# Patient Record
Sex: FEMALE | Race: WHITE | Employment: FULL TIME | URBAN - NONMETROPOLITAN AREA
[De-identification: names, ages, dates, MRNs, and addresses within clinical notes are randomized per-mention and may not be internally consistent; named-entity substitution may affect disease eponyms.]

---

## 2019-11-11 ENCOUNTER — OFFICE VISIT (OUTPATIENT)
Dept: FAMILY MEDICINE CLINIC | Age: 39
End: 2019-11-11
Payer: COMMERCIAL

## 2019-11-11 ENCOUNTER — HOSPITAL ENCOUNTER (OUTPATIENT)
Age: 39
Discharge: HOME | End: 2019-11-13
Payer: COMMERCIAL

## 2019-11-11 VITALS
HEART RATE: 70 BPM | BODY MASS INDEX: 29.32 KG/M2 | TEMPERATURE: 97.7 F | HEIGHT: 65 IN | WEIGHT: 176 LBS | DIASTOLIC BLOOD PRESSURE: 80 MMHG | OXYGEN SATURATION: 99 % | SYSTOLIC BLOOD PRESSURE: 120 MMHG

## 2019-11-11 DIAGNOSIS — E03.9 HYPOTHYROIDISM, UNSPECIFIED TYPE: ICD-10-CM

## 2019-11-11 DIAGNOSIS — E11.65 TYPE 2 DIABETES MELLITUS WITH HYPERGLYCEMIA, WITHOUT LONG-TERM CURRENT USE OF INSULIN (HCC): Primary | ICD-10-CM

## 2019-11-11 DIAGNOSIS — E11.65 TYPE 2 DIABETES MELLITUS WITH HYPERGLYCEMIA, WITHOUT LONG-TERM CURRENT USE OF INSULIN (HCC): ICD-10-CM

## 2019-11-11 LAB
ALBUMIN SERPL-MCNC: 4.6 G/DL (ref 3.5–5.2)
ALP BLD-CCNC: 160 U/L (ref 35–104)
ALT SERPL-CCNC: 31 U/L (ref 0–32)
ANION GAP SERPL CALCULATED.3IONS-SCNC: 22 MMOL/L (ref 7–16)
AST SERPL-CCNC: 15 U/L (ref 0–31)
BASOPHILS ABSOLUTE: 0.04 E9/L (ref 0–0.2)
BASOPHILS RELATIVE PERCENT: 0.5 % (ref 0–2)
BILIRUB SERPL-MCNC: 0.3 MG/DL (ref 0–1.2)
BUN BLDV-MCNC: 11 MG/DL (ref 6–20)
CALCIUM SERPL-MCNC: 9.5 MG/DL (ref 8.6–10.2)
CHLORIDE BLD-SCNC: 97 MMOL/L (ref 98–107)
CHOLESTEROL, TOTAL: 109 MG/DL (ref 0–199)
CO2: 19 MMOL/L (ref 22–29)
CREAT SERPL-MCNC: 0.6 MG/DL (ref 0.5–1)
CREATININE URINE: 84 MG/DL (ref 29–226)
EOSINOPHILS ABSOLUTE: 0.04 E9/L (ref 0.05–0.5)
EOSINOPHILS RELATIVE PERCENT: 0.5 % (ref 0–6)
GFR AFRICAN AMERICAN: >60
GFR NON-AFRICAN AMERICAN: >60 ML/MIN/1.73
GLUCOSE BLD-MCNC: 339 MG/DL (ref 74–99)
HBA1C MFR BLD: 13.5 % (ref 4–5.6)
HCT VFR BLD CALC: 41.8 % (ref 34–48)
HDLC SERPL-MCNC: 35 MG/DL
HEMOGLOBIN: 13.1 G/DL (ref 11.5–15.5)
IMMATURE GRANULOCYTES #: 0.03 E9/L
IMMATURE GRANULOCYTES %: 0.3 % (ref 0–5)
LDL CHOLESTEROL CALCULATED: 62 MG/DL (ref 0–99)
LYMPHOCYTES ABSOLUTE: 2.48 E9/L (ref 1.5–4)
LYMPHOCYTES RELATIVE PERCENT: 28.8 % (ref 20–42)
MCH RBC QN AUTO: 26.4 PG (ref 26–35)
MCHC RBC AUTO-ENTMCNC: 31.3 % (ref 32–34.5)
MCV RBC AUTO: 84.1 FL (ref 80–99.9)
MICROALBUMIN UR-MCNC: <12 MG/L
MICROALBUMIN/CREAT UR-RTO: ABNORMAL (ref 0–30)
MONOCYTES ABSOLUTE: 0.56 E9/L (ref 0.1–0.95)
MONOCYTES RELATIVE PERCENT: 6.5 % (ref 2–12)
NEUTROPHILS ABSOLUTE: 5.47 E9/L (ref 1.8–7.3)
NEUTROPHILS RELATIVE PERCENT: 63.4 % (ref 43–80)
PDW BLD-RTO: 12.7 FL (ref 11.5–15)
PLATELET # BLD: 381 E9/L (ref 130–450)
PMV BLD AUTO: 10.4 FL (ref 7–12)
POTASSIUM SERPL-SCNC: 4 MMOL/L (ref 3.5–5)
RBC # BLD: 4.97 E12/L (ref 3.5–5.5)
SODIUM BLD-SCNC: 138 MMOL/L (ref 132–146)
T4 FREE: 1.26 NG/DL (ref 0.93–1.7)
TOTAL CK: 56 U/L (ref 20–180)
TOTAL PROTEIN: 8.2 G/DL (ref 6.4–8.3)
TRIGL SERPL-MCNC: 61 MG/DL (ref 0–149)
TSH SERPL DL<=0.05 MIU/L-ACNC: 2.94 UIU/ML (ref 0.27–4.2)
URIC ACID, SERUM: 2.4 MG/DL (ref 2.4–5.7)
VLDLC SERPL CALC-MCNC: 12 MG/DL
WBC # BLD: 8.6 E9/L (ref 4.5–11.5)

## 2019-11-11 PROCEDURE — 99214 OFFICE O/P EST MOD 30 MIN: CPT | Performed by: FAMILY MEDICINE

## 2019-11-11 PROCEDURE — 80053 COMPREHEN METABOLIC PANEL: CPT

## 2019-11-11 PROCEDURE — 80061 LIPID PANEL: CPT

## 2019-11-11 PROCEDURE — 85025 COMPLETE CBC W/AUTO DIFF WBC: CPT

## 2019-11-11 PROCEDURE — 84443 ASSAY THYROID STIM HORMONE: CPT

## 2019-11-11 PROCEDURE — 83036 HEMOGLOBIN GLYCOSYLATED A1C: CPT

## 2019-11-11 PROCEDURE — 82550 ASSAY OF CK (CPK): CPT

## 2019-11-11 PROCEDURE — 82044 UR ALBUMIN SEMIQUANTITATIVE: CPT

## 2019-11-11 PROCEDURE — 36415 COLL VENOUS BLD VENIPUNCTURE: CPT

## 2019-11-11 PROCEDURE — 82570 ASSAY OF URINE CREATININE: CPT

## 2019-11-11 PROCEDURE — 84550 ASSAY OF BLOOD/URIC ACID: CPT

## 2019-11-11 PROCEDURE — 84439 ASSAY OF FREE THYROXINE: CPT

## 2019-11-11 ASSESSMENT — ENCOUNTER SYMPTOMS
DIARRHEA: 0
ABDOMINAL PAIN: 0
EYE PAIN: 0
SORE THROAT: 0
SINUS PAIN: 0
BACK PAIN: 0
COUGH: 0
SHORTNESS OF BREATH: 0
CONSTIPATION: 0

## 2019-11-11 ASSESSMENT — PATIENT HEALTH QUESTIONNAIRE - PHQ9
SUM OF ALL RESPONSES TO PHQ9 QUESTIONS 1 & 2: 0
2. FEELING DOWN, DEPRESSED OR HOPELESS: 0
SUM OF ALL RESPONSES TO PHQ QUESTIONS 1-9: 0
SUM OF ALL RESPONSES TO PHQ QUESTIONS 1-9: 0
1. LITTLE INTEREST OR PLEASURE IN DOING THINGS: 0

## 2019-11-12 RX ORDER — PIOGLITAZONEHYDROCHLORIDE 15 MG/1
15 TABLET ORAL DAILY
Qty: 30 TABLET | Refills: 3 | Status: SHIPPED | OUTPATIENT
Start: 2019-11-12 | End: 2020-01-16 | Stop reason: SDUPTHER

## 2019-11-26 ENCOUNTER — TELEPHONE (OUTPATIENT)
Dept: FAMILY MEDICINE CLINIC | Age: 39
End: 2019-11-26

## 2020-01-16 ENCOUNTER — TELEPHONE (OUTPATIENT)
Dept: FAMILY MEDICINE CLINIC | Age: 40
End: 2020-01-16

## 2020-01-16 RX ORDER — PIOGLITAZONEHYDROCHLORIDE 15 MG/1
15 TABLET ORAL DAILY
Qty: 30 TABLET | Refills: 3 | Status: SHIPPED
Start: 2020-01-16 | End: 2020-10-15

## 2020-04-06 RX ORDER — LEVOTHYROXINE SODIUM 0.05 MG/1
50 TABLET ORAL DAILY
Qty: 30 TABLET | Refills: 2 | Status: SHIPPED
Start: 2020-04-06 | End: 2020-09-11 | Stop reason: SDUPTHER

## 2020-04-06 NOTE — TELEPHONE ENCOUNTER
She is calling to get a new script for Synthroid sent to [facility] in [facility].

## 2020-09-11 ENCOUNTER — OFFICE VISIT (OUTPATIENT)
Dept: ENDOCRINOLOGY | Age: 40
End: 2020-09-11
Payer: COMMERCIAL

## 2020-09-11 VITALS
WEIGHT: 169.6 LBS | SYSTOLIC BLOOD PRESSURE: 118 MMHG | DIASTOLIC BLOOD PRESSURE: 70 MMHG | OXYGEN SATURATION: 99 % | BODY MASS INDEX: 27.37 KG/M2 | TEMPERATURE: 97.3 F | RESPIRATION RATE: 16 BRPM | HEART RATE: 83 BPM

## 2020-09-11 LAB — HBA1C MFR BLD: 13.8 %

## 2020-09-11 PROCEDURE — 99204 OFFICE O/P NEW MOD 45 MIN: CPT | Performed by: INTERNAL MEDICINE

## 2020-09-11 PROCEDURE — 83036 HEMOGLOBIN GLYCOSYLATED A1C: CPT | Performed by: INTERNAL MEDICINE

## 2020-09-11 RX ORDER — LEVOTHYROXINE SODIUM 0.05 MG/1
50 TABLET ORAL DAILY
Qty: 90 TABLET | Refills: 3 | Status: SHIPPED
Start: 2020-09-11 | End: 2021-02-22 | Stop reason: SDUPTHER

## 2020-09-11 RX ORDER — GLUCOSAMINE HCL/CHONDROITIN SU 500-400 MG
CAPSULE ORAL
Qty: 150 STRIP | Refills: 5 | Status: SHIPPED
Start: 2020-09-11 | End: 2021-08-16 | Stop reason: SDUPTHER

## 2020-09-11 NOTE — PROGRESS NOTES
[address] [place] [address] Department of Endocrinology Diabetes and Metabolism   [address] [facility] [address]   Phone: [phone]  Fax: [phone]    Date of Service: 9/11/2020    Primary Care Physician: Lee Sandhoff, DO  Referring physician: Andrew Agarwal,*  Provider: Macey Gimenez MD     Reason for the visit:  DM type 2     History of Present Illness: The history is provided by the patient. No  was used. Accuracy of the patient data is excellent. Brandon Slaughter is a very pleasant 37 y.o. female seen today for diabetes management     Brandon Slaughter was diagnosed with diabetes at age 27years old and currently on Actos 15 mg daily, Metformin 1000 mg BID   The patient has not been checking blood sugar at all   Most recent A1c results summarized below  Lab Results   Component Value Date    LABA1C 13.8 09/11/2020    LABA1C 13.5 11/11/2019       Patient has had no hypoglycemic episodes   The patient hasn't been mindful of what has been eating and wasn't following diabetic diet as encouraged  I reviewed current medications and the patient has no issues with diabetes medications but is not taking them regularly. Brandon Slaughter is up to date with eye exam and denied any history of diabetic retinopathy   The patient is due for an eye exam. no h/o diabetic retinopathy  Microvascular complications:  No Retinopathy, Nephropathy or Neuropathy   Macrovascular complications: no CAD, PVD, or Stroke  The patient receives has not received flu shot this year. PAST MEDICAL HISTORY   Past Medical History:   Diagnosis Date    Diabetes mellitus ([address]     Gestational    Pancreatitis 1993       PAST SURGICAL HISTORY   Past Surgical History:   Procedure Laterality Date    CHOLECYSTECTOMY      PANCREAS SURGERY N/A 2003    Bypass intestines through pancreas       SOCIAL HISTORY   Tobacco:   reports that she has never smoked.  She has never used smokeless tobacco.  Alcohol:   reports no history of alcohol use. Drugs:   reports no history of drug use. FAMILY HISTORY   No family history on file. ALLERGIES AND DRUG REACTIONS   Allergies   Allergen Reactions    Demerol Hcl [Meperidine] Itching    Meperidine Hcl        CURRENT MEDICATIONS   Current Outpatient Medications   Medication Sig Dispense Refill    levothyroxine (SYNTHROID) 50 MCG tablet Take 1 tablet by mouth Daily 90 tablet 3    blood glucose monitor strips One-Touch Verio strips. Checks 2 times/day and as needed for symptoms of irregular blood glucose 150 strip 5    lidocaine (XYLOCAINE) 2 % jelly Apply 1/2 ml topically as needed. 120 mL 0    pioglitazone (ACTOS) 15 MG tablet Take 1 tablet by mouth daily 30 tablet 3    metFORMIN (GLUCOPHAGE) 500 MG tablet Take 2 tablets by mouth 2 times daily (with meals) 120 tablet 5     No current facility-administered medications for this visit. Review of Systems  Constitutional: No fever, no chills, no diaphoresis, no generalized weakness. HEENT: No blurred vision, No sore throat, no ear pain, no hair loss  Neck: denied any neck swelling, difficulty swallowing,   Cardio-pulmonary: No CP, SOB or palpitation, No orthopnea or PND. No cough or wheezing. GI: No N/V/D, no constipation, No abdominal pain, no melena or hematochezia   : Denied any dysuria, hematuria, flank pain, discharge, or incontinence. Skin: denied any rash, ulcer, Hirsute, or hyperpigmentation. MSK: denied any joint deformity, joint pain/swelling, muscle pain, or back pain.   Neuro: no numbness, no tingling, no weakness, _    OBJECTIVE    /70 (Site: Right Upper Arm, Position: Sitting, Cuff Size: Medium Adult)   Pulse 83   Temp 97.3 °F (36.3 °C) (Temporal)   Resp 16   Wt 169 lb 9.6 oz (76.9 kg)   SpO2 99%   BMI 27.37 kg/m²   BP Readings from Last 4 Encounters:   09/11/20 118/70   05/18/20 130/84   05/04/20 130/83   11/11/19 120/80     Wt Readings from Last 6 Encounters: 09/11/20 169 lb 9.6 oz (76.9 kg)   05/18/20 171 lb (77.6 kg)   05/04/20 175 lb (79.4 kg)   11/11/19 176 lb (79.8 kg)   08/15/18 182 lb (82.6 kg)   12/07/17 190 lb (86.2 kg)       Physical examination:  General: awake alert, oriented x3, no abnormal position or movements. HEENT: normocephalic non-traumatic, no exophthalmos   Neck: supple, no LN enlargement, no thyromegaly, no thyroid tenderness, no JVD. Pulm: Clear equal air entry no added sounds, no wheezing or rhonchi    CVS: S1 + S2, no murmur, no heave. Dorsalis pedis pulse palpable   Abd: soft lax, no tenderness, no organomegaly, audible bowel sounds. Skin: warm, no lesions, no rash.  No callus, no Ulcers, No acanthosis nigricans  Musculoskeletal: No back tenderness, no kyphosis/scoliosis    Neuro: CN intact, Monofilament sensation decreased bilateral , muscle power normal  Psych: normal mood, and affect      Review of Laboratory Data:  I personally reviewed the following lab:  Lab Results   Component Value Date/Time    WBC 8.6 11/11/2019 10:54 AM    RBC 4.97 11/11/2019 10:54 AM    HGB 13.1 11/11/2019 10:54 AM    HCT 41.8 11/11/2019 10:54 AM    MCV 84.1 11/11/2019 10:54 AM    MCH 26.4 11/11/2019 10:54 AM    MCHC 31.3 (L) 11/11/2019 10:54 AM    RDW 12.7 11/11/2019 10:54 AM     11/11/2019 10:54 AM    MPV 10.4 11/11/2019 10:54 AM      Lab Results   Component Value Date/Time     11/11/2019 10:54 AM    K 4.0 11/11/2019 10:54 AM    CO2 19 (L) 11/11/2019 10:54 AM    BUN 11 11/11/2019 10:54 AM    CREATININE 0.6 11/11/2019 10:54 AM    CALCIUM 9.5 11/11/2019 10:54 AM    LABGLOM >60 11/11/2019 10:54 AM    GFRAA >60 11/11/2019 10:54 AM      Lab Results   Component Value Date/Time    TSH 2.940 11/11/2019 10:54 AM    T4FREE 1.26 11/11/2019 10:54 AM     Lab Results   Component Value Date    LABA1C 13.8 09/11/2020    GLUCOSE 339 11/11/2019    MALBCR - 11/11/2019    LABMICR <12.0 11/11/2019    LABCREA 84 11/11/2019     Lab Results   Component Value Date Hemoglobin F2M    Basic Metabolic Panel    Ambulatory referral to Diabetic Education    blood glucose monitor strips   2. Hypothyroidism, unspecified type  TSH without Reflex    T4, Free    levothyroxine (SYNTHROID) 50 MCG tablet   3. Vitamin D deficiency  Vitamin D 25 Hydroxy     Radha Varela MD  Endocrinologist, [facility] Diabetes Care and Endocrinology   [address] [facility] [address]   Phone: [phone]  Fax: [phone]  --------------------------------------------  An electronic signature was used to authenticate this note.  Merlin Boys, MD on 9/11/2020 at 1:30 PM

## 2020-09-11 NOTE — PATIENT INSTRUCTIONS
Recommendations for today's visit  · Continue Metformin 1000 mg twice a day   · Stop Actos   · Start Glipizide ER 5 mg daily   · Start Tresiba 10 units daily at bedtime (plan to stop in few weeks)   · Check Blood sugar 2 times/day before meals and at bedtime and send us sugar log in a week. These are your blood sugar, blood pressure, cholesterol and A1c goals:  · Blood sugar fastin mg/dl to 130 mg/dl  · Blood sugar before meals: <150 mg/dl  · Peak blood sugar lower than 180 mg/dl  · A1c: between 6.5 - 7%    I you have any questions please call Dr. Spike Jolly office     Khang Bello MD  Endocrinologist, [facility])   [address] [facility], [address] [place] [id number]   Phone: [phone]  Fax: [phone]  Email: [email or web address]

## 2020-09-14 ENCOUNTER — TELEPHONE (OUTPATIENT)
Dept: ENDOCRINOLOGY | Age: 40
End: 2020-09-14

## 2020-09-14 RX ORDER — GLIPIZIDE 5 MG/1
5 TABLET, FILM COATED, EXTENDED RELEASE ORAL DAILY
Qty: 90 TABLET | Refills: 3 | Status: SHIPPED
Start: 2020-09-14 | End: 2020-10-15 | Stop reason: SDUPTHER

## 2020-09-17 ENCOUNTER — FOLLOWUP TELEPHONE ENCOUNTER (OUTPATIENT)
Dept: DIABETES SERVICES | Age: 40
End: 2020-09-17

## 2020-09-17 NOTE — PROGRESS NOTES
Patient has been scheduled for DSMES on 9/21. COVID-19 screen completed as follows:     Patient has no fever greater than 99.9 degrees  Patient has no new onset of cough, SOB, difficulty breathing  Patient has no new onset of sore throat, myalgia, headache, loss of taste/smell, diarrhea, severe headache, abdominal pain, rash, red eyes, vomiting, bruising or bleeding  Patient has not been in contact with someone who was confirmed or suspected to have COVID-19 in the last month  Patient has no personal history of COVID-19 within the last 28 days      Patient was encouraged to self-quarantine between now and the date of his/her appointment.       Patient was instructed to do the following on the day of his/her appointment:      Wear a mask  Do not bring any guests with you  Do not come any earlier than 5 minutes prior to the scheduled appointment time

## 2020-09-18 RX ORDER — PEN NEEDLE, DIABETIC 32GX 5/32"
1 NEEDLE, DISPOSABLE MISCELLANEOUS DAILY
Qty: 100 EACH | Refills: 3 | Status: SHIPPED | OUTPATIENT
Start: 2020-09-18

## 2020-09-21 ENCOUNTER — HOSPITAL ENCOUNTER (OUTPATIENT)
Dept: DIABETES SERVICES | Age: 40
Setting detail: THERAPIES SERIES
Discharge: HOME | End: 2020-09-21
Payer: COMMERCIAL

## 2020-09-21 VITALS — TEMPERATURE: 98 F

## 2020-09-21 PROCEDURE — G0108 DIAB MANAGE TRN  PER INDIV: HCPCS

## 2020-09-21 SDOH — ECONOMIC STABILITY: FOOD INSECURITY: ADDITIONAL INFORMATION: NO

## 2020-09-21 ASSESSMENT — PATIENT HEALTH QUESTIONNAIRE - PHQ9
SUM OF ALL RESPONSES TO PHQ QUESTIONS 1-9: 0
2. FEELING DOWN, DEPRESSED OR HOPELESS: 0
SUM OF ALL RESPONSES TO PHQ9 QUESTIONS 1 & 2: 0
SUM OF ALL RESPONSES TO PHQ QUESTIONS 1-9: 0
1. LITTLE INTEREST OR PLEASURE IN DOING THINGS: 0

## 2020-09-21 NOTE — PROGRESS NOTES
Diabetes Self-Management Education Record    Participant Name: Alaina Gottlieb  Referring Provider: Lola Nolan DO  Assessment/Evaluation Ratings:  1=Needs Instruction   4=Demonstrates Understanding/Competency  2=Needs Review   NC=Not Covered    3=Comprehends Key Points  N/A=Not Applicable  Topics/Learning Objectives Pre-session Assess Date:  9/21/20 KMS Instr. Date Follow-up Date Post- session Eval Comments   Diabetes disease process & Treatment process:   -Define diabetes & prediabetes and ABCs of     diabetes   -Identify own type of diabetes  -Identify lifestyle changes/treatment options 2 9/21/20 KMS  4 Type 2, A1C 13.8%  H/O GDM 14 years ago   H/O non-alcoholic pancreatitis (runs in her family)    Developing strategies for Healthy coping/psychosocial issues:    -Describe feelings about living with diabetes  -Identify coping strategies;   -Identify support needed & support network available 2 9/21/20 KMS  4     9/21/20   PHQ-2 Depression Screen Score: 0  PHQ-9 Score: 0     Prevention, detection & treatment of Chronic complications:    -Identify the prevention, detection and treatment for complications including immunizations, preventive eye, foot, dental and renal exams as indicated per the participant's duration of diabetes and health status.  -Define the natural course of diabetes and the relationship of blood glucose levels to long term complications of diabetes.   2 9/21/20 KMS  4    Prevention, detection & treatment of acute complications:    -List symptoms of hyper & hypoglycemia, and prevention & treatment strategies.   -Describe sick day guidelines  DKA /indications for ketone testing &  when to call physician  1 9/21/20 KMS  4    Identify severe weather/situation crisis  & diabetes supplies management 1 9/21/20 KMS  4    Using medications safely:   -State effects of diabetes medicines on blood glucose levels;  -List diabetes medication taken, action & side effects 2 9/21/20 KMS  4 Metformin 1000 mg BID  Glipizide ER 5 mg daily  Tresiba 10 units daily (patient states doctor is hopeful to wean off in 3 to 4 weeks)    Insulin/Injectables  -Name appropriate injection sites; proper storage; supplies needed;  2 9/21/20 KMS  4     Demonstrate proper technique        Monitoring blood glucose, interpreting and using results:   -Identify recommended & personal blood glucose targets & HgbA1C target levels  -State the Importance of logging blood glucose levels for pattern recognition;   -State benefits of reading/using pt generated health data  -Verbalize safe lancet disposal 2 9/21/20 KMS  4 Monitors 2-3 times per day   -Demonstrate proper testing technique        Incorporating physical activity into lifestyle:   -State effect of exercise on blood glucose levels;   -State benefits of regular exercise;   -Define safety considerations;  -Describe contraindications/maintenance of activity. 2 9/21/20 KMS  4 Walks, bikes    Incorporating nutritional management into lifestyle:   -Describe effect of type, amount & timing of food on blood glucose  -Describe methods for preparing and planning healthy meals  Correctly read food labels 2 9/21/20 KMS  4 Verbalizes a good understanding of using the plate method, measuring carbohydrate portions, and reading labels. Will work towards eating three meals daily, spacing them out every 4.5 to 5 hours. Plan personal carbohydrate-controlled meal based on individualized meal plan  Demonstrate CHO counting/portion control         Developing strategies for problem solving to promote health/change behavior. -Identify 7 self-care behaviors; Personal health risk factors; Benefits, challenges & strategies for behavioral change and set an individualized goal selection. 1 9/21/20 KMS  4 Goal:  I will use the plate method and measure my carbohydrate servings at meals.       Identified Barriers to learning/adherence to self management plan:    None  []  other    Instruction Method: Lecture/Discussion and Handouts    Supporting Education Materials/Equipment Provided: Self-management manual   []Pashto materials       [] services     []Other:      Encounter Type Date Attended Start Time End Time Comments No Show Dates   Assessment 9/21/20 KMS 1000 1020       Session 1 & 2 9/21/20 KMS 6010 [id number]                       In person Follow-up         Gestational Diabetes         Individual MNT        Meter Instrx        Insulin Instrx           Additional Comments: All DSMES completed 1:1 d/t COVID-19 national emergency. Doctor notified via EMR.      Date:           DSMES Follow-up plan based on patients DSMES goal    Dr Notified by [] EMR []Fax        []HgbA1C   []Weight   []Hypertension  []Follow-up with Physician  []Medication compliance   []Plate method/meal plan compliance   []Self-Foot Exam Frequency   []Monitoring Frequency   []Exercise Routine   []Goal Attainment       []Patient lost to follow-up  Dr Notified by []EMR []Fax     Personal Support Plan:      [x] Keep all scheduled doctor appointments   [x] Make and keep appointments with specialists (foot, eye, dentist) as recommended   [] Consult my pharmacist about all new medications or to ask any medication questions   [] Get tested for sleep apnea   [] Seek help for:   [x] Make an appointment with:  Ophthalmologist   [] Attend smoking cessation classes or call 1-800-QUIT-NOW  [] Attend Diabetes Support Group   [] Use diabetes magazines, books, or credible web-sites like the ADA for more information  [] Increase exercise at home or join an exercise program:   [] Other:

## 2020-09-21 NOTE — LETTER
[facility])- Diabetes Education    2020       Re:     Chayito Andrade         :  1980  Dear Dr. Ana Rosa Schmitt:                    Thank you for referring your patient, Chayito Andrade, for diabetes education. Your patient has completed her personalized initial comprehensive education plan. Your patient attended class on 20 that addressed the following topics:    Nursing/Medical [x]      Nutrition [x]  [] Diabetes disease process & treatment   [] Diabetes medication use and safety   [] Self-monitoring blood glucose/interpreting results  [] Prevention, detection and treatment of acute complications  [] Prevention, detection and treatment of chronic complications  [] Developing strategies to address psychosocial issues    [] Nutritional management: basic principles   [] Carbohydrate counting, plate method, label reading  [] Benefits of/ways to incorporate physical activity  [] Problem solving and preparing for crisis situations  [] Diabetes supply management  [] Goal setting and behavior modification         PHQ-9 Depression Screen Score:  0  0-4: Minimal Depression                5-9: Mild Depression    10-14: Moderate Depression  15-19: Moderately Severe Depression  20-27: Severe Depression     COMMENTS:      PATIENT SELECTED GOAL:   [x]  I will follow my meal plan and measure my carbohydrate foods. []  I will increase my activity to:  []  I will check my blood glucose as ordered by my doctor. []  I will take my medications at the correct times as ordered by my doctor.   []  Other:      DIABETES SELF-MANAGEMENT SUPPORT PLAN/REFERRALS (patient identified):  [x] Keep my scheduled visits with my doctor   [x] Make and keep appointments with specialists (foot, eye, dentist) as recommended  [] Consult my pharmacist with all new medications and/or any medication questions  [] Get tested for sleep apnea  [] Seek help for:   [x] Make an appointment with: ophthalmologist [] Attend smoking cessation classes or call help-line (1-947-QUIT-NOW; [phone])  [] Attend a diabetes support group  [] Use diabetes magazines, books, or the American Diabetes Association website (www.diabetes. org) for more information    [] Start or increase exercising at home or join a program:  [] Other: There will be a follow-up in 3 months to evaluate the attainment of their chosen goal, and self identified support plan and you will be notified of their progress. Thank you for referring this patient to our program.  Please do not hesitate to call if you have any questions at [phone] [facility] or [facility]) or [phone] ([facility]).         Sincerely,    Diabetes Educators:     []  MEGAN Mota      [x]  MEGAN Larson     []  RADHA Cooper         []  MS RADHA Ferrera   []  Luiza ROWLAND  []  RADHA Duvall        Diabetes

## 2020-10-15 ENCOUNTER — OFFICE VISIT (OUTPATIENT)
Dept: ENDOCRINOLOGY | Age: 40
End: 2020-10-15
Payer: COMMERCIAL

## 2020-10-15 ENCOUNTER — OFFICE VISIT (OUTPATIENT)
Dept: FAMILY MEDICINE CLINIC | Age: 40
End: 2020-10-15
Payer: COMMERCIAL

## 2020-10-15 VITALS
SYSTOLIC BLOOD PRESSURE: 124 MMHG | RESPIRATION RATE: 16 BRPM | HEART RATE: 83 BPM | DIASTOLIC BLOOD PRESSURE: 70 MMHG | WEIGHT: 171.8 LBS | OXYGEN SATURATION: 98 % | TEMPERATURE: 98.2 F | BODY MASS INDEX: 27.73 KG/M2

## 2020-10-15 VITALS
TEMPERATURE: 97.9 F | DIASTOLIC BLOOD PRESSURE: 68 MMHG | BODY MASS INDEX: 28.49 KG/M2 | HEIGHT: 65 IN | WEIGHT: 171 LBS | OXYGEN SATURATION: 99 % | HEART RATE: 70 BPM | SYSTOLIC BLOOD PRESSURE: 114 MMHG

## 2020-10-15 PROCEDURE — 99213 OFFICE O/P EST LOW 20 MIN: CPT | Performed by: NURSE PRACTITIONER

## 2020-10-15 PROCEDURE — 99214 OFFICE O/P EST MOD 30 MIN: CPT | Performed by: INTERNAL MEDICINE

## 2020-10-15 RX ORDER — GLIPIZIDE 5 MG/1
5 TABLET, FILM COATED, EXTENDED RELEASE ORAL DAILY
Qty: 90 TABLET | Refills: 2 | Status: SHIPPED
Start: 2020-10-15 | End: 2021-08-16 | Stop reason: SDUPTHER

## 2020-10-15 ASSESSMENT — ENCOUNTER SYMPTOMS
VOMITING: 0
CHEST TIGHTNESS: 0
COUGH: 0
DIARRHEA: 0
NAUSEA: 0
CONSTIPATION: 0
SHORTNESS OF BREATH: 0
ABDOMINAL PAIN: 0
WHEEZING: 0

## 2020-10-15 NOTE — PROGRESS NOTES
Chief Complaint:   Other (lump on lip; )      History of Present Illness   HPI:  Patient presents today for a mass on her left upper lip. Patient reports that it has been there for the last couple months. She states it is not painful. She tried to \"pop\" it and poke it with a needle to get rid of it. She states there is no drainage or bleeding from the mass. The mass is mobile. She denies any fever or chills. She is requesting a referral to plastics for removal of lump. Prior to Visit Medications    Medication Sig Taking? Authorizing Provider   metFORMIN (GLUCOPHAGE) 500 MG tablet Take 2 tablets by mouth 2 times daily (with meals) Yes Floresita Collins MD   glipiZIDE (GLUCOTROL XL) 5 MG extended release tablet Take 1 tablet by mouth daily Yes Floresita Collins MD   Insulin Pen Needle (BD PEN NEEDLE JULIAN U/F) 32G X 4 MM MISC 1 each by Does not apply route daily Yes Floresita Collins MD   levothyroxine (SYNTHROID) 50 MCG tablet Take 1 tablet by mouth Daily Yes Floresita Collins MD   blood glucose monitor strips One-Touch Verio strips. Checks 2 times/day and as needed for symptoms of irregular blood glucose Yes Layo Luque MD   lidocaine (XYLOCAINE) 2 % jelly Apply 1/2 ml topically as needed. Yes Hilda Kingsley, [facility]       Review of Systems   Review of Systems   Constitutional: Negative for activity change, chills and fever. HENT: Negative for congestion. Respiratory: Negative for cough, chest tightness, shortness of breath and wheezing. Cardiovascular: Negative for chest pain, palpitations and leg swelling. Gastrointestinal: Negative for abdominal pain, constipation, diarrhea, nausea and vomiting. Genitourinary: Negative for dysuria and urgency. Musculoskeletal: Negative for myalgias and neck pain. Neurological: Negative for dizziness, weakness, light-headedness and numbness. Psychiatric/Behavioral: The patient is not nervous/anxious.         Patient's medical, social,

## 2020-10-15 NOTE — PROGRESS NOTES
[address] [facility] Department of Endocrinology Diabetes and Metabolism   [address] [facility] [address]   Phone: [phone]  Fax: [phone]    Date of Service: 10/15/2020    Primary Care Physician: Karlie Stoner DO  Referring physician: No ref. provider found  Provider: Elizabeth Solis MD     Reason for the visit:  DM type 2     History of Present Illness: The history is provided by the patient. No  was used. Accuracy of the patient data is excellent. Chivo Fowler is a very pleasant 37 y.o. female seen today for diabetes management     Chivo Fowler was diagnosed with diabetes at age 27years old and currently on  Metformin 1000 mg BID and glipizide 5 mg daily. Had a sample of tresiba for 2-3 weeks and completed. The patient has been checking BG at least 2 times a day ranging from . Most recent A1c results summarized below  Lab Results   Component Value Date    LABA1C 13.8 09/11/2020    LABA1C 13.5 11/11/2019       Patient has had no hypoglycemic episodes   The patient has been mindful of what has been eating and has been following diabetic diet as encouraged, with occasional \"cheat day\"   I reviewed current medications and the patient has no issues with diabetes medications and taking them regularly. Chivo Fowler is due for an eye exam. no h/o diabetic retinopathy  Microvascular complications:  No Retinopathy, Nephropathy or Neuropathy   Macrovascular complications: no CAD, PVD, or Stroke  The patient receives has not received flu shot this year. PAST MEDICAL HISTORY   Past Medical History:   Diagnosis Date    Diabetes mellitus ([address]     Gestational    Pancreatitis 1993       PAST SURGICAL HISTORY   Past Surgical History:   Procedure Laterality Date    CHOLECYSTECTOMY      PANCREAS SURGERY N/A 2003    Bypass intestines through pancreas       SOCIAL HISTORY   Tobacco:   reports that she has never smoked.  She has never used smokeless tobacco.  Alcohol:   reports no history of alcohol use. Drugs:   reports no history of drug use. FAMILY HISTORY   No family history on file. ALLERGIES AND DRUG REACTIONS   Allergies   Allergen Reactions    Demerol Hcl [Meperidine] Itching    Meperidine Hcl        CURRENT MEDICATIONS   Current Outpatient Medications   Medication Sig Dispense Refill    Insulin Pen Needle (BD PEN NEEDLE JULIAN U/F) 32G X 4 MM MISC 1 each by Does not apply route daily 100 each 3    glipiZIDE (GLUCOTROL XL) 5 MG extended release tablet Take 1 tablet by mouth daily 90 tablet 3    levothyroxine (SYNTHROID) 50 MCG tablet Take 1 tablet by mouth Daily 90 tablet 3    blood glucose monitor strips One-Touch Verio strips. Checks 2 times/day and as needed for symptoms of irregular blood glucose 150 strip 5    lidocaine (XYLOCAINE) 2 % jelly Apply 1/2 ml topically as needed. 120 mL 0    metFORMIN (GLUCOPHAGE) 500 MG tablet Take 2 tablets by mouth 2 times daily (with meals) 120 tablet 5     No current facility-administered medications for this visit. Review of Systems  Constitutional: No fever, no chills, no diaphoresis, no generalized weakness. HEENT: No blurred vision, No sore throat, no ear pain, no hair loss  Neck: denied any neck swelling, difficulty swallowing,   Cardio-pulmonary: No CP, SOB or palpitation, No orthopnea or PND. No cough or wheezing. GI: No N/V/D, no constipation, No abdominal pain, no melena or hematochezia   : Denied any dysuria, hematuria, flank pain, discharge, or incontinence. Skin: denied any rash, ulcer, Hirsute, or hyperpigmentation. MSK: denied any joint deformity, joint pain/swelling, muscle pain, or back pain.   Neuro: no numbness, no tingling, no weakness,     OBJECTIVE    /70 (Site: Right Upper Arm, Position: Sitting, Cuff Size: Medium Adult)   Pulse 83   Temp 98.2 °F (36.8 °C) (Temporal)   Resp 16   Wt 171 lb 12.8 oz (77.9 kg)   SpO2 98%   BMI 27.73 kg/m²   BP Readings from Last 4 Encounters:   10/15/20 124/70   09/11/20 118/70   05/18/20 130/84   05/04/20 130/83     Wt Readings from Last 6 Encounters:   10/15/20 171 lb 12.8 oz (77.9 kg)   09/11/20 169 lb 9.6 oz (76.9 kg)   05/18/20 171 lb (77.6 kg)   05/04/20 175 lb (79.4 kg)   11/11/19 176 lb (79.8 kg)   08/15/18 182 lb (82.6 kg)       Physical examination:  General: awake alert, oriented x3, no abnormal position or movements. HEENT: normocephalic non-traumatic, no exophthalmos   Neck: supple, no LN enlargement, no thyromegaly, no thyroid tenderness, no JVD. Pulm: Clear equal air entry no added sounds, no wheezing or rhonchi    CVS: S1 + S2, no murmur, no heave. Dorsalis pedis pulse palpable   Abd: soft lax, no tenderness, no organomegaly, audible bowel sounds. Skin: warm, no lesions, no rash.  No callus, no Ulcers, No acanthosis nigricans  Musculoskeletal: No back tenderness, no kyphosis/scoliosis    Neuro: CN intact, Monofilament sensation bilateral , muscle power normal  Psych: normal mood, and affect      Review of Laboratory Data:  I personally reviewed the following lab:  Lab Results   Component Value Date/Time    WBC 8.6 11/11/2019 10:54 AM    RBC 4.97 11/11/2019 10:54 AM    HGB 13.1 11/11/2019 10:54 AM    HCT 41.8 11/11/2019 10:54 AM    MCV 84.1 11/11/2019 10:54 AM    MCH 26.4 11/11/2019 10:54 AM    MCHC 31.3 (L) 11/11/2019 10:54 AM    RDW 12.7 11/11/2019 10:54 AM     11/11/2019 10:54 AM    MPV 10.4 11/11/2019 10:54 AM      Lab Results   Component Value Date/Time     11/11/2019 10:54 AM    K 4.0 11/11/2019 10:54 AM    CO2 19 (L) 11/11/2019 10:54 AM    BUN 11 11/11/2019 10:54 AM    CREATININE 0.6 11/11/2019 10:54 AM    CALCIUM 9.5 11/11/2019 10:54 AM    LABGLOM >60 11/11/2019 10:54 AM    GFRAA >60 11/11/2019 10:54 AM      Lab Results   Component Value Date/Time    TSH 2.940 11/11/2019 10:54 AM    T4FREE 1.26 11/11/2019 10:54 AM     Lab Results   Component Value Date    LABA1C 13.8 09/11/2020 GLUCOSE 339 11/11/2019    MALBCR - 11/11/2019    LABMICR <12.0 11/11/2019    LABCREA 84 11/11/2019     Lab Results   Component Value Date    LABA1C 13.8 09/11/2020    LABA1C 13.5 11/11/2019     Lab Results   Component Value Date    TRIG 61 11/11/2019    HDL 35 11/11/2019    LDLCALC 62 11/11/2019    CHOL 109 11/11/2019     No results found for: [address] [place] [address] [address] Records/Labs/Images review:   I personally reviewed and summarized previous records   All labs and imaging studies were independently reviewed     [address] [facility], a 37 y.o.-old female seen in for the following issues     Diabetes Mellitus Type 2    · Improved control   · Will continue Metformin 1000 mg BID, Glipiizde ER 5 mg daily  · The patient was advised to check blood sugars 2 times a day and send BS readings to our office in a week. · Discussed with patient A1c and blood sugar goals   · Patient will need routine diabetes maintenance and prevention  · The patient was referred to diabetic educator for further teaching   · Diabetes labs before next visit     H/o Dietary noncompliance   Improved    Discussed with patient the importance of eating consistent carbohydrate meals, avoiding high glycemic index food. Also, discussed with patient the risk and negative consequences of dietary noncompliance on blood glucose control, blood pressure and weight    Hypothyroidism  · On synthroid 50 mcg daily  · Follow up TSH next visit. Return in about 4 months (around 2/15/2021) for DM type 2 . The above issues were reviewed with the patient who understood and agreed with the plan. Thank you for allowing us to participate in the care of this patient. Please do not hesitate to contact us with any additional questions. Diagnosis Orders   1. Hypothyroidism, unspecified type  TSH without Reflex    T4, Free   2. Vitamin D deficiency  Vitamin D 25 Hydroxy   3.  Type 2 diabetes mellitus without complication, without long-term current use of insulin (HCC)  Basic Metabolic Panel    Microalbumin / Creatinine Urine Ratio    Hemoglobin A1C    metFORMIN (GLUCOPHAGE) 500 MG tablet    glipiZIDE (GLUCOTROL XL) 5 MG extended release tablet     Melissa Abdi MD  Endocrinologist, [facility] Diabetes Care and Endocrinology   [address] [facility], [address] [place] [address],[address] [address]   Phone: [phone]  Fax: [phone]  --------------------------------------------  An electronic signature was used to authenticate this note.  Andrew Gonzalez MD on 10/15/2020 at 11:46 AM

## 2020-12-18 ENCOUNTER — TELEPHONE (OUTPATIENT)
Dept: DIABETES SERVICES | Age: 40
End: 2020-12-18

## 2021-02-15 DIAGNOSIS — E03.9 HYPOTHYROIDISM, UNSPECIFIED TYPE: ICD-10-CM

## 2021-02-15 DIAGNOSIS — E55.9 VITAMIN D DEFICIENCY: ICD-10-CM

## 2021-02-15 DIAGNOSIS — E11.9 TYPE 2 DIABETES MELLITUS WITHOUT COMPLICATION, WITHOUT LONG-TERM CURRENT USE OF INSULIN (HCC): ICD-10-CM

## 2021-02-15 LAB
CREATININE URINE: 159 MG/DL (ref 29–226)
HBA1C MFR BLD: 7.8 % (ref 4–5.6)
MICROALBUMIN UR-MCNC: <12 MG/L
MICROALBUMIN/CREAT UR-RTO: ABNORMAL (ref 0–30)

## 2021-02-16 LAB
ANION GAP SERPL CALCULATED.3IONS-SCNC: 8 MMOL/L (ref 7–16)
BUN BLDV-MCNC: 17 MG/DL (ref 6–20)
CALCIUM SERPL-MCNC: 9.4 MG/DL (ref 8.6–10.2)
CHLORIDE BLD-SCNC: 106 MMOL/L (ref 98–107)
CO2: 26 MMOL/L (ref 22–29)
CREAT SERPL-MCNC: 0.7 MG/DL (ref 0.5–1)
GFR AFRICAN AMERICAN: >60
GFR NON-AFRICAN AMERICAN: >60 ML/MIN/1.73
GLUCOSE BLD-MCNC: 108 MG/DL (ref 74–99)
POTASSIUM SERPL-SCNC: 4.2 MMOL/L (ref 3.5–5)
SODIUM BLD-SCNC: 140 MMOL/L (ref 132–146)
T4 FREE: 1.25 NG/DL (ref 0.93–1.7)
TSH SERPL DL<=0.05 MIU/L-ACNC: 4.3 UIU/ML (ref 0.27–4.2)
VITAMIN D 25-HYDROXY: 17 NG/ML (ref 30–100)

## 2021-02-22 ENCOUNTER — OFFICE VISIT (OUTPATIENT)
Dept: ENDOCRINOLOGY | Age: 41
End: 2021-02-22
Payer: COMMERCIAL

## 2021-02-22 VITALS
OXYGEN SATURATION: 98 % | BODY MASS INDEX: 31.28 KG/M2 | WEIGHT: 188 LBS | TEMPERATURE: 97.7 F | SYSTOLIC BLOOD PRESSURE: 124 MMHG | DIASTOLIC BLOOD PRESSURE: 72 MMHG | HEART RATE: 75 BPM

## 2021-02-22 DIAGNOSIS — E03.9 HYPOTHYROIDISM, UNSPECIFIED TYPE: Primary | ICD-10-CM

## 2021-02-22 DIAGNOSIS — E55.9 VITAMIN D DEFICIENCY: ICD-10-CM

## 2021-02-22 DIAGNOSIS — E11.69 TYPE 2 DIABETES MELLITUS WITH OTHER SPECIFIED COMPLICATION, UNSPECIFIED WHETHER LONG TERM INSULIN USE (HCC): ICD-10-CM

## 2021-02-22 PROCEDURE — 99214 OFFICE O/P EST MOD 30 MIN: CPT | Performed by: INTERNAL MEDICINE

## 2021-02-22 PROCEDURE — 3051F HG A1C>EQUAL 7.0%<8.0%: CPT | Performed by: INTERNAL MEDICINE

## 2021-02-22 RX ORDER — LEVOTHYROXINE SODIUM 0.05 MG/1
TABLET ORAL
Qty: 110 TABLET | Refills: 3 | Status: SHIPPED
Start: 2021-02-22 | End: 2021-08-16 | Stop reason: SDUPTHER

## 2021-02-22 NOTE — PROGRESS NOTES
[address] [place] [address] Department of Endocrinology Diabetes and Metabolism   [address] [facility] [address]   Phone: [phone]  Fax: [phone]    Date of Service: 2/22/2021    Primary Care Physician: Cynthia Pedersen DO  Provider: Desiree Wallis MD     Reason for the visit:  DM type 2     History of Present Illness: The history is provided by the patient. No  was used. Accuracy of the patient data is excellent. Jose Maria Mcrae is a very pleasant 41 y.o. female seen today for diabetes management     Jose Maria Mcrae was diagnosed with diabetes at age 27years old and currently on  Metformin 1000 mg BID and glipizide 5 mg daily. Had a sample of tresiba for 2-3 weeks and completed. The patient has been checking BG at least 2 times a day and most readinsg at gal   Most recent A1c results summarized below  Lab Results   Component Value Date    LABA1C 7.8 02/15/2021    LABA1C 13.8 09/11/2020    LABA1C 13.5 11/11/2019       Patient has had no hypoglycemic episodes   The patient has been mindful of what has been eating and has been following diabetic diet as encouraged, with occasional \"cheat day\"   I reviewed current medications and the patient has no issues with diabetes medications and taking them regularly. Jose Maria Mcrae is due for an eye exam. no h/o diabetic retinopathy  Microvascular complications:  No Retinopathy, Nephropathy or Neuropathy   Macrovascular complications: no CAD, PVD, or Stroke  The patient receives has not received flu shot this year. PAST MEDICAL HISTORY   Past Medical History:   Diagnosis Date    Diabetes mellitus ([address]     Gestational    Pancreatitis 1993       PAST SURGICAL HISTORY   Past Surgical History:   Procedure Laterality Date    CHOLECYSTECTOMY      PANCREAS SURGERY N/A 2003    Bypass intestines through pancreas       SOCIAL HISTORY   Tobacco:   reports that she has never smoked.  She has never used smokeless tobacco.  Alcohol:   reports no history of alcohol use. Drugs:   reports no history of drug use. FAMILY HISTORY   No family history on file. ALLERGIES AND DRUG REACTIONS   Allergies   Allergen Reactions    Demerol Hcl [Meperidine] Itching    Meperidine Hcl        CURRENT MEDICATIONS   Current Outpatient Medications   Medication Sig Dispense Refill    metFORMIN (GLUCOPHAGE) 500 MG tablet Take 2 tablets by mouth 2 times daily (with meals) 360 tablet 2    glipiZIDE (GLUCOTROL XL) 5 MG extended release tablet Take 1 tablet by mouth daily 90 tablet 2    Insulin Pen Needle (BD PEN NEEDLE JULIAN U/F) 32G X 4 MM MISC 1 each by Does not apply route daily 100 each 3    levothyroxine (SYNTHROID) 50 MCG tablet Take 1 tablet by mouth Daily 90 tablet 3    blood glucose monitor strips One-Touch Verio strips. Checks 2 times/day and as needed for symptoms of irregular blood glucose 150 strip 5    lidocaine (XYLOCAINE) 2 % jelly Apply 1/2 ml topically as needed. 120 mL 0     No current facility-administered medications for this visit. Review of Systems  Constitutional: No fever, no chills, no diaphoresis, no generalized weakness. HEENT: No blurred vision, No sore throat, no ear pain, no hair loss  Neck: denied any neck swelling, difficulty swallowing,   Cardio-pulmonary: No CP, SOB or palpitation, No orthopnea or PND. No cough or wheezing. GI: No N/V/D, no constipation, No abdominal pain, no melena or hematochezia   : Denied any dysuria, hematuria, flank pain, discharge, or incontinence. Skin: denied any rash, ulcer, Hirsute, or hyperpigmentation. MSK: denied any joint deformity, joint pain/swelling, muscle pain, or back pain.   Neuro: no numbness, no tingling, no weakness,     OBJECTIVE    /72   Pulse 75   Temp 97.7 °F (36.5 °C)   Wt 188 lb (85.3 kg)   SpO2 98%   BMI 31.28 kg/m²   BP Readings from Last 4 Encounters:   02/22/21 124/72   10/15/20 114/68   10/15/20 124/70   09/11/20 118/70     Wt Readings from Last 6 Encounters:   02/22/21 188 lb (85.3 kg)   10/15/20 171 lb (77.6 kg)   10/15/20 171 lb 12.8 oz (77.9 kg)   09/11/20 169 lb 9.6 oz (76.9 kg)   05/18/20 171 lb (77.6 kg)   05/04/20 175 lb (79.4 kg)       Physical examination:  General: awake alert, oriented x3, no abnormal position or movements. HEENT: normocephalic non-traumatic, no exophthalmos   Neck: supple, no LN enlargement, no thyromegaly, no thyroid tenderness, no JVD. Pulm: Clear equal air entry no added sounds, no wheezing or rhonchi    CVS: S1 + S2, no murmur, no heave. Dorsalis pedis pulse palpable   Abd: soft lax, no tenderness, no organomegaly, audible bowel sounds. Skin: warm, no lesions, no rash.  No callus, no Ulcers, No acanthosis nigricans  Musculoskeletal: No back tenderness, no kyphosis/scoliosis    Neuro: CN intact, Monofilament sensation bilateral , muscle power normal  Psych: normal mood, and affect      Review of Laboratory Data:  I personally reviewed the following lab:  Lab Results   Component Value Date/Time    WBC 8.6 11/11/2019 10:54 AM    RBC 4.97 11/11/2019 10:54 AM    HGB 13.1 11/11/2019 10:54 AM    HCT 41.8 11/11/2019 10:54 AM    MCV 84.1 11/11/2019 10:54 AM    MCH 26.4 11/11/2019 10:54 AM    MCHC 31.3 (L) 11/11/2019 10:54 AM    RDW 12.7 11/11/2019 10:54 AM     11/11/2019 10:54 AM    MPV 10.4 11/11/2019 10:54 AM      Lab Results   Component Value Date/Time     02/15/2021 12:40 PM    K 4.2 02/15/2021 12:40 PM    CO2 26 02/15/2021 12:40 PM    BUN 17 02/15/2021 12:40 PM    CREATININE 0.7 02/15/2021 12:40 PM    CALCIUM 9.4 02/15/2021 12:40 PM    LABGLOM >60 02/15/2021 12:40 PM    GFRAA >60 02/15/2021 12:40 PM      Lab Results   Component Value Date/Time    TSH 4.300 (H) 02/15/2021 12:40 PM    T4FREE 1.25 02/15/2021 12:40 PM     Lab Results   Component Value Date    LABA1C 7.8 02/15/2021    GLUCOSE 108 02/15/2021    MALBCR - 02/15/2021    LABMICR <12.0 02/15/2021    LABCREA 159 02/15/2021     Lab Results to contact us with any additional questions. Diagnosis Orders   1. Hypothyroidism, unspecified type  levothyroxine (SYNTHROID) 50 MCG tablet    TSH without Reflex    T4, Free   2. Vitamin D deficiency  vitamin D (CHOLECALCIFEROL) 19023 UNIT CAPS    Vitamin D 25 Hydroxy    Basic Metabolic Panel   3. Type 2 diabetes mellitus with other specified complication, unspecified whether long term insulin use (HCC)  Hemoglobin O3C    Basic Metabolic Panel     Loida Skelton MD  Endocrinologist, [facility] and Endocrinology   [address] [place] [address]   Phone: [phone]  Fax: [phone]  --------------------------------------------  An electronic signature was used to authenticate this note.  Dre Guerrero MD on 2/22/2021 at 10:12 AM

## 2021-04-19 ENCOUNTER — OFFICE VISIT (OUTPATIENT)
Dept: FAMILY MEDICINE CLINIC | Age: 41
End: 2021-04-19
Payer: COMMERCIAL

## 2021-04-19 VITALS
SYSTOLIC BLOOD PRESSURE: 124 MMHG | HEART RATE: 92 BPM | WEIGHT: 179 LBS | TEMPERATURE: 97.7 F | OXYGEN SATURATION: 97 % | DIASTOLIC BLOOD PRESSURE: 78 MMHG | BODY MASS INDEX: 29.79 KG/M2

## 2021-04-19 DIAGNOSIS — K90.9 DIARRHEA DUE TO MALABSORPTION: Primary | ICD-10-CM

## 2021-04-19 DIAGNOSIS — R19.7 DIARRHEA DUE TO MALABSORPTION: Primary | ICD-10-CM

## 2021-04-19 PROCEDURE — 99213 OFFICE O/P EST LOW 20 MIN: CPT | Performed by: FAMILY MEDICINE

## 2021-04-19 RX ORDER — DICYCLOMINE HYDROCHLORIDE 10 MG/1
20 CAPSULE ORAL 4 TIMES DAILY
Qty: 120 CAPSULE | Refills: 3 | Status: SHIPPED | OUTPATIENT
Start: 2021-04-19

## 2021-04-19 ASSESSMENT — ENCOUNTER SYMPTOMS
RESPIRATORY NEGATIVE: 1
DIARRHEA: 1
ABDOMINAL DISTENTION: 1
NAUSEA: 0
ABDOMINAL PAIN: 1
ANAL BLEEDING: 0
BLOOD IN STOOL: 1
VOMITING: 0

## 2021-04-19 NOTE — PROGRESS NOTES
21  Chester Hernandez Hiltbrand : 1980 Sex: female  Age: 41 y.o. Chief Complaint   Patient presents with    Diarrhea       This patient is a 58-year-old white female with a history of chronic pancreatitis who has had persistent diarrhea of approximately 7 to 10 days no significant weight loss. The diarrhea is loose and is orally and is consistent with chronic pancreatitis. She has had no melena or hematochezia no nausea vomiting. Review of Systems   Constitutional: Negative. Respiratory: Negative. Cardiovascular: Negative. Gastrointestinal: Positive for abdominal distention, abdominal pain, blood in stool and diarrhea. Negative for anal bleeding, nausea and vomiting. Current Outpatient Medications:     dicyclomine (BENTYL) 10 MG capsule, Take 2 capsules by mouth 4 times daily, Disp: 120 capsule, Rfl: 3    vitamin D (CHOLECALCIFEROL) 76206 UNIT CAPS, Take 1 capsule weekly, Disp: 8 capsule, Rfl: 0    levothyroxine (SYNTHROID) 50 MCG tablet, Take 1 tablet 6 days a week and 2 tablets on , Disp: 110 tablet, Rfl: 3    metFORMIN (GLUCOPHAGE) 500 MG tablet, Take 2 tablets by mouth 2 times daily (with meals), Disp: 360 tablet, Rfl: 2    glipiZIDE (GLUCOTROL XL) 5 MG extended release tablet, Take 1 tablet by mouth daily, Disp: 90 tablet, Rfl: 2    Insulin Pen Needle (BD PEN NEEDLE JULIAN U/F) 32G X 4 MM MISC, 1 each by Does not apply route daily, Disp: 100 each, Rfl: 3    blood glucose monitor strips, One-Touch Verio strips. Checks 2 times/day and as needed for symptoms of irregular blood glucose, Disp: 150 strip, Rfl: 5    lidocaine (XYLOCAINE) 2 % jelly, Apply 1/2 ml topically as needed. , Disp: 120 mL, Rfl: 0  Allergies   Allergen Reactions    Demerol Hcl [Meperidine] Itching    Meperidine Hcl        Past Medical History:   Diagnosis Date    Diabetes mellitus ([facility]     Gestational    Pancreatitis      Past Surgical History:   Procedure Laterality Date    CHOLECYSTECTOMY      PANCREAS SURGERY N/A 2003    Bypass intestines through pancreas     No family history on file. Social History     Tobacco Use    Smoking status: Never Smoker    Smokeless tobacco: Never Used   Substance Use Topics    Alcohol use: No    Drug use: No        Vitals:    04/19/21 0812   BP: 124/78   Pulse: 92   Temp: 97.7 °F (36.5 °C)   SpO2: 97%   Weight: 179 lb (81.2 kg)       Physical Exam  Vitals signs reviewed. Constitutional:       Appearance: Normal appearance. HENT:      Head: Normocephalic and atraumatic. Cardiovascular:      Rate and Rhythm: Normal rate and regular rhythm. Heart sounds: No murmur. Pulmonary:      Breath sounds: Normal breath sounds. Abdominal:      General: There is no distension. Palpations: There is no mass. Tenderness: There is no abdominal tenderness. There is no right CVA tenderness, left CVA tenderness, guarding or rebound. Hernia: No hernia is present. Neurological:      Mental Status: She is alert. Assessment and Plan:  Harlan was seen today for diarrhea. Diagnoses and all orders for this visit:    Diarrhea due to malabsorption    Other orders  -     dicyclomine (BENTYL) 10 MG capsule; Take 2 capsules by mouth 4 times daily        Orders Placed This Encounter   Medications    dicyclomine (BENTYL) 10 MG capsule     Sig: Take 2 capsules by mouth 4 times daily     Dispense:  120 capsule     Refill:  3        Patient advised to follow up with PCP as needed. Seen By:  Huang Issa, DO  Arrangements for her to have a PCP which will be Thursday in addition she is to watch her diet as far as fatty foods give her Bentyl to take she probably needs a pancreatic enzyme which Dr. Braxton Barrientos will give her.

## 2021-04-22 ENCOUNTER — OFFICE VISIT (OUTPATIENT)
Dept: FAMILY MEDICINE CLINIC | Age: 41
End: 2021-04-22
Payer: COMMERCIAL

## 2021-04-22 VITALS
OXYGEN SATURATION: 98 % | SYSTOLIC BLOOD PRESSURE: 120 MMHG | HEIGHT: 65 IN | TEMPERATURE: 97.4 F | BODY MASS INDEX: 30.06 KG/M2 | HEART RATE: 71 BPM | DIASTOLIC BLOOD PRESSURE: 64 MMHG | WEIGHT: 180.4 LBS

## 2021-04-22 DIAGNOSIS — E11.9 TYPE 2 DIABETES MELLITUS WITHOUT COMPLICATION, WITHOUT LONG-TERM CURRENT USE OF INSULIN (HCC): ICD-10-CM

## 2021-04-22 DIAGNOSIS — E03.9 HYPOTHYROIDISM, UNSPECIFIED TYPE: ICD-10-CM

## 2021-04-22 DIAGNOSIS — K86.1 IDIOPATHIC CHRONIC PANCREATITIS (HCC): Primary | ICD-10-CM

## 2021-04-22 PROCEDURE — 99214 OFFICE O/P EST MOD 30 MIN: CPT | Performed by: FAMILY MEDICINE

## 2021-04-22 PROCEDURE — 3051F HG A1C>EQUAL 7.0%<8.0%: CPT | Performed by: FAMILY MEDICINE

## 2021-04-22 ASSESSMENT — ENCOUNTER SYMPTOMS
SHORTNESS OF BREATH: 0
WHEEZING: 0

## 2021-04-22 ASSESSMENT — PATIENT HEALTH QUESTIONNAIRE - PHQ9
SUM OF ALL RESPONSES TO PHQ9 QUESTIONS 1 & 2: 0
SUM OF ALL RESPONSES TO PHQ QUESTIONS 1-9: 0
SUM OF ALL RESPONSES TO PHQ QUESTIONS 1-9: 0
1. LITTLE INTEREST OR PLEASURE IN DOING THINGS: 0

## 2021-04-22 NOTE — PROGRESS NOTES
Tamiko Boyle presents to the office today for   Chief Complaint   Patient presents with   Verena Greer Doctor    Abdominal Pain     follow up from [facility]     Diarrhea  Started 2 weeks ago continuously  Hx of chronic pancreatitis since age 13 or 13  No recent antibiotics  Metformin for many years without issue  Bentyl with relief  No blood in stool  4-5 stools within 30 minutes of eating in AM    GYN Dr. Betzy Maria up to date    Engaged  2 children ages 15 and 9    Works at [employer]      Review of Systems   Constitutional: Negative for chills and fever. Respiratory: Negative for shortness of breath and wheezing. Cardiovascular: Negative for chest pain and palpitations. Genitourinary: Negative for dysuria, hematuria and urgency. Skin: Negative for rash. Neurological: Negative for dizziness and light-headedness. /64   Pulse 71   Temp 97.4 °F (36.3 °C) (Temporal)   Ht 5' 5\" (1.651 m)   Wt 180 lb 6.4 oz (81.8 kg)   SpO2 98%   BMI 30.02 kg/m²   Physical Exam  Constitutional:       Appearance: Normal appearance. HENT:      Head: Normocephalic and atraumatic. Eyes:      Extraocular Movements: Extraocular movements intact. Conjunctiva/sclera: Conjunctivae normal.   Cardiovascular:      Rate and Rhythm: Normal rate. Heart sounds: Normal heart sounds. Pulmonary:      Effort: Pulmonary effort is normal.      Breath sounds: Normal breath sounds. Abdominal:      Palpations: Abdomen is soft. Tenderness: There is no abdominal tenderness. Skin:     General: Skin is warm. Neurological:      Mental Status: She is alert and oriented to person, place, and time.    Psychiatric:         Mood and Affect: Mood normal.         Behavior: Behavior normal.            Current Outpatient Medications:     dicyclomine (BENTYL) 10 MG capsule, Take 2 capsules by mouth 4 times daily, Disp: 120 capsule, Rfl: 3   vitamin D (CHOLECALCIFEROL) 81942 UNIT CAPS, Take 1 capsule weekly, Disp: 8 capsule, Rfl: 0    levothyroxine (SYNTHROID) 50 MCG tablet, Take 1 tablet 6 days a week and 2 tablets on Sunday, Disp: 110 tablet, Rfl: 3    metFORMIN (GLUCOPHAGE) 500 MG tablet, Take 2 tablets by mouth 2 times daily (with meals), Disp: 360 tablet, Rfl: 2    glipiZIDE (GLUCOTROL XL) 5 MG extended release tablet, Take 1 tablet by mouth daily, Disp: 90 tablet, Rfl: 2    Insulin Pen Needle (BD PEN NEEDLE JULIAN U/F) 32G X 4 MM MISC, 1 each by Does not apply route daily, Disp: 100 each, Rfl: 3    blood glucose monitor strips, One-Touch Verio strips. Checks 2 times/day and as needed for symptoms of irregular blood glucose, Disp: 150 strip, Rfl: 5     Past Medical History:   Diagnosis Date    Diabetes mellitus ([facility] [address] [facility]     Gestational    Hypothyroidism 4/22/2021    Pancreatitis Sue Mazariegos was seen today for established new doctor and abdominal pain.     Diagnoses and all orders for this visit:    Idiopathic chronic pancreatitis ([facility] [address]    Type 2 diabetes mellitus without complication, without long-term current use of insulin (HCC)    Hypothyroidism, unspecified type       Continue Bentyl regularly for another week in hopes of symptoms returning to baseline  Creon trial in 1 week if no better  Finally, referral to Dr. Rashaun Rosenthal if she doesn't improve with Tracee Espino MD

## 2021-08-16 ENCOUNTER — OFFICE VISIT (OUTPATIENT)
Dept: ENDOCRINOLOGY | Age: 41
End: 2021-08-16
Payer: COMMERCIAL

## 2021-08-16 VITALS
SYSTOLIC BLOOD PRESSURE: 123 MMHG | RESPIRATION RATE: 18 BRPM | BODY MASS INDEX: 30.49 KG/M2 | HEIGHT: 65 IN | HEART RATE: 68 BPM | OXYGEN SATURATION: 98 % | WEIGHT: 183 LBS | DIASTOLIC BLOOD PRESSURE: 84 MMHG

## 2021-08-16 DIAGNOSIS — E11.9 TYPE 2 DIABETES MELLITUS WITHOUT COMPLICATION, WITHOUT LONG-TERM CURRENT USE OF INSULIN (HCC): ICD-10-CM

## 2021-08-16 DIAGNOSIS — E03.9 HYPOTHYROIDISM, UNSPECIFIED TYPE: Primary | ICD-10-CM

## 2021-08-16 DIAGNOSIS — E55.9 VITAMIN D DEFICIENCY: ICD-10-CM

## 2021-08-16 DIAGNOSIS — E11.69 TYPE 2 DIABETES MELLITUS WITH OTHER SPECIFIED COMPLICATION, UNSPECIFIED WHETHER LONG TERM INSULIN USE (HCC): ICD-10-CM

## 2021-08-16 LAB — HBA1C MFR BLD: 8.8 %

## 2021-08-16 PROCEDURE — 99214 OFFICE O/P EST MOD 30 MIN: CPT | Performed by: INTERNAL MEDICINE

## 2021-08-16 PROCEDURE — 3052F HG A1C>EQUAL 8.0%<EQUAL 9.0%: CPT | Performed by: INTERNAL MEDICINE

## 2021-08-16 PROCEDURE — 83036 HEMOGLOBIN GLYCOSYLATED A1C: CPT | Performed by: INTERNAL MEDICINE

## 2021-08-16 RX ORDER — LANCETS 30 GAUGE
1 EACH MISCELLANEOUS 2 TIMES DAILY
Qty: 300 EACH | Refills: 1 | Status: SHIPPED | OUTPATIENT
Start: 2021-08-16

## 2021-08-16 RX ORDER — GLIPIZIDE 5 MG/1
5 TABLET, FILM COATED, EXTENDED RELEASE ORAL DAILY
Qty: 90 TABLET | Refills: 2 | Status: SHIPPED
Start: 2021-08-16 | End: 2022-06-07 | Stop reason: SDUPTHER

## 2021-08-16 RX ORDER — LEVOTHYROXINE SODIUM 0.05 MG/1
TABLET ORAL
Qty: 110 TABLET | Refills: 3 | Status: SHIPPED
Start: 2021-08-16 | End: 2022-06-07 | Stop reason: SDUPTHER

## 2021-08-16 RX ORDER — GLUCOSAMINE HCL/CHONDROITIN SU 500-400 MG
CAPSULE ORAL
Qty: 150 STRIP | Refills: 5 | Status: SHIPPED
Start: 2021-08-16 | End: 2022-06-07 | Stop reason: SDUPTHER

## 2021-08-16 NOTE — PROGRESS NOTES
smokeless tobacco.  Alcohol:   reports no history of alcohol use. Drugs:   reports no history of drug use. FAMILY HISTORY   No family history on file. ALLERGIES AND DRUG REACTIONS   Allergies   Allergen Reactions    Demerol Hcl [Meperidine] Itching    Meperidine Hcl        CURRENT MEDICATIONS   Current Outpatient Medications   Medication Sig Dispense Refill    dicyclomine (BENTYL) 10 MG capsule Take 2 capsules by mouth 4 times daily 120 capsule 3    vitamin D (CHOLECALCIFEROL) 69990 UNIT CAPS Take 1 capsule weekly 8 capsule 0    levothyroxine (SYNTHROID) 50 MCG tablet Take 1 tablet 6 days a week and 2 tablets on Sunday 110 tablet 3    metFORMIN (GLUCOPHAGE) 500 MG tablet Take 2 tablets by mouth 2 times daily (with meals) 360 tablet 2    glipiZIDE (GLUCOTROL XL) 5 MG extended release tablet Take 1 tablet by mouth daily 90 tablet 2    Insulin Pen Needle (BD PEN NEEDLE JULIAN U/F) 32G X 4 MM MISC 1 each by Does not apply route daily 100 each 3    blood glucose monitor strips One-Touch Verio strips. Checks 2 times/day and as needed for symptoms of irregular blood glucose 150 strip 5     No current facility-administered medications for this visit. Review of Systems  Constitutional: No fever, no chills, no diaphoresis, no generalized weakness. HEENT: No blurred vision, No sore throat, no ear pain, no hair loss  Neck: denied any neck swelling, difficulty swallowing,   Cardio-pulmonary: No CP, SOB or palpitation, No orthopnea or PND. No cough or wheezing. GI: No N/V/D, no constipation, No abdominal pain, no melena or hematochezia   : Denied any dysuria, hematuria, flank pain, discharge, or incontinence. Skin: denied any rash, ulcer, Hirsute, or hyperpigmentation. MSK: denied any joint deformity, joint pain/swelling, muscle pain, or back pain.   Neuro: no numbness, no tingling, no weakness,     OBJECTIVE    /84   Pulse 68   Resp 18   Ht 5' 5\" (1.651 m)   Wt 183 lb (83 kg)   SpO2 98% BMI 30.45 kg/m²   BP Readings from Last 4 Encounters:   08/16/21 123/84   04/22/21 120/64   04/19/21 124/78   02/22/21 124/72     Wt Readings from Last 6 Encounters:   08/16/21 183 lb (83 kg)   04/22/21 180 lb 6.4 oz (81.8 kg)   04/19/21 179 lb (81.2 kg)   02/22/21 188 lb (85.3 kg)   10/15/20 171 lb (77.6 kg)   10/15/20 171 lb 12.8 oz (77.9 kg)       Physical examination:  General: awake alert, oriented x3, no abnormal position or movements. HEENT: normocephalic non-traumatic, no exophthalmos   Neck: supple, no LN enlargement, no thyromegaly, no thyroid tenderness, no JVD. Pulm: Clear equal air entry no added sounds, no wheezing or rhonchi    CVS: S1 + S2, no murmur, no heave. Dorsalis pedis pulse palpable   Abd: soft lax, no tenderness, no organomegaly, audible bowel sounds. Skin: warm, no lesions, no rash.  No callus, no Ulcers, No acanthosis nigricans  Musculoskeletal: No back tenderness, no kyphosis/scoliosis    Neuro: CN intact, Monofilament sensation bilateral , muscle power normal  Psych: normal mood, and affect      Review of Laboratory Data:  I personally reviewed the following lab:  Lab Results   Component Value Date/Time    WBC 8.6 11/11/2019 10:54 AM    RBC 4.97 11/11/2019 10:54 AM    HGB 13.1 11/11/2019 10:54 AM    HCT 41.8 11/11/2019 10:54 AM    MCV 84.1 11/11/2019 10:54 AM    MCH 26.4 11/11/2019 10:54 AM    MCHC 31.3 (L) 11/11/2019 10:54 AM    RDW 12.7 11/11/2019 10:54 AM     11/11/2019 10:54 AM    MPV 10.4 11/11/2019 10:54 AM      Lab Results   Component Value Date/Time     02/15/2021 12:40 PM    K 4.2 02/15/2021 12:40 PM    CO2 26 02/15/2021 12:40 PM    BUN 17 02/15/2021 12:40 PM    CREATININE 0.7 02/15/2021 12:40 PM    CALCIUM 9.4 02/15/2021 12:40 PM    LABGLOM >60 02/15/2021 12:40 PM    GFRAA >60 02/15/2021 12:40 PM      Lab Results   Component Value Date/Time    TSH 4.300 (H) 02/15/2021 12:40 PM    T4FREE 1.25 02/15/2021 12:40 PM     Lab Results   Component Value Date    LABA1C 8.8 08/16/2021    GLUCOSE 108 02/15/2021    MALBCR - 02/15/2021    LABMICR <12.0 02/15/2021    LABCREA 159 02/15/2021     Lab Results   Component Value Date    LABA1C 8.8 08/16/2021    LABA1C 7.8 02/15/2021    LABA1C 13.8 09/11/2020     Lab Results   Component Value Date    TRIG 61 11/11/2019    HDL 35 11/11/2019    LDLCALC 62 11/11/2019    CHOL 109 11/11/2019     Lab Results   Component Value Date    VITD25 17 02/15/2021     ASSESSMENT & RECOMMENDATIONS   Jean Mckeon SHRUTHI Lopez, a 41 y.o.-old female seen in for the following issues     Diabetes Mellitus Type 2    · worsening control, A1c 8.8%. pt admit poor compliance with diet recently   · Will continue Metformin 1000 mg BID, Glipiizde ER 5 mg daily  · The patient was advised to check blood sugars 2 times a day and send BS readings to our office in a week. · If BG remained high, will likely increase Glipizide to 10 gm daily   · Discussed with patient A1c and blood sugar goals   · Patient will need routine diabetes maintenance and prevention  · The patient was referred to diabetic educator for further teaching   · Labs before next visit     H/o Dietary noncompliance   Still an issue    Discussed with patient the importance of eating consistent carbohydrate meals, avoiding high glycemic index food. Also, discussed with patient the risk and negative consequences of dietary noncompliance on blood glucose control, blood pressure and weight    Hypothyroidism  · On synthroid 50 mcg 1 tab 6 days a wk and 2 tab on Sunday   · TFT before next visit     I personally reviewed external notes from PCP and other patient's care team providers, and personally interpreted labs associated with the above diagnosis. I also ordered labs to further assess and manage the above addressed medical conditions    Return in about 4 months (around 12/16/2021) for DM type 2, VitD deficiency, hypothyroidism.     The above issues were reviewed with the patient who understood and agreed with the plan.    Thank you for allowing us to participate in the care of this patient. Please do not hesitate to contact us with any additional questions. Diagnosis Orders   1. Hypothyroidism, unspecified type  POCT glycosylated hemoglobin (Hb A1C)    levothyroxine (SYNTHROID) 50 MCG tablet    TSH without Reflex    T4, Free   2. Type 2 diabetes mellitus without complication, without long-term current use of insulin (HCC)  metFORMIN (GLUCOPHAGE) 500 MG tablet    glipiZIDE (GLUCOTROL XL) 5 MG extended release tablet    Lipid Panel    Comprehensive Metabolic Panel    Hemoglobin A1C    Microalbumin / Creatinine Urine Ratio    Lancets MISC   3. Vitamin D deficiency  Vitamin D 25 Hydroxy   4. Type 2 diabetes mellitus with other specified complication, unspecified whether long term insulin use ([facility] [address]  blood glucose monitor strips     William Almanzar MD  Endocrinologist, [facility]   [address] [place] [id number]   Phone: [phone]  Fax: [phone]  --------------------------------------------  An electronic signature was used to authenticate this note.  Maribell Carter MD on 8/16/2021 at 1:15 PM

## 2022-06-07 ENCOUNTER — OFFICE VISIT (OUTPATIENT)
Dept: ENDOCRINOLOGY | Age: 42
End: 2022-06-07
Payer: COMMERCIAL

## 2022-06-07 VITALS
DIASTOLIC BLOOD PRESSURE: 86 MMHG | OXYGEN SATURATION: 100 % | BODY MASS INDEX: 27.66 KG/M2 | HEART RATE: 84 BPM | SYSTOLIC BLOOD PRESSURE: 131 MMHG | WEIGHT: 162 LBS | HEIGHT: 64 IN

## 2022-06-07 DIAGNOSIS — E55.9 VITAMIN D DEFICIENCY: ICD-10-CM

## 2022-06-07 DIAGNOSIS — E11.69 TYPE 2 DIABETES MELLITUS WITH OTHER SPECIFIED COMPLICATION, UNSPECIFIED WHETHER LONG TERM INSULIN USE (HCC): ICD-10-CM

## 2022-06-07 DIAGNOSIS — E11.65 TYPE 2 DIABETES MELLITUS WITH HYPERGLYCEMIA, WITHOUT LONG-TERM CURRENT USE OF INSULIN (HCC): ICD-10-CM

## 2022-06-07 DIAGNOSIS — E11.65 TYPE 2 DIABETES MELLITUS WITH HYPERGLYCEMIA, WITHOUT LONG-TERM CURRENT USE OF INSULIN (HCC): Primary | ICD-10-CM

## 2022-06-07 DIAGNOSIS — Z91.119 DIETARY NONCOMPLIANCE: ICD-10-CM

## 2022-06-07 DIAGNOSIS — E03.9 HYPOTHYROIDISM, UNSPECIFIED TYPE: ICD-10-CM

## 2022-06-07 LAB
CREATININE URINE: 43 MG/DL (ref 29–226)
HBA1C MFR BLD: 14.8 %
HCT VFR BLD CALC: 38.3 % (ref 34–48)
HEMOGLOBIN: 11.6 G/DL (ref 11.5–15.5)
MCH RBC QN AUTO: 25.5 PG (ref 26–35)
MCHC RBC AUTO-ENTMCNC: 30.3 % (ref 32–34.5)
MCV RBC AUTO: 84.2 FL (ref 80–99.9)
MICROALBUMIN UR-MCNC: <12 MG/L
MICROALBUMIN/CREAT UR-RTO: ABNORMAL (ref 0–30)
PDW BLD-RTO: 12.9 FL (ref 11.5–15)
PLATELET # BLD: 366 E9/L (ref 130–450)
PMV BLD AUTO: 10.2 FL (ref 7–12)
RBC # BLD: 4.55 E12/L (ref 3.5–5.5)
WBC # BLD: 8 E9/L (ref 4.5–11.5)

## 2022-06-07 PROCEDURE — 3046F HEMOGLOBIN A1C LEVEL >9.0%: CPT | Performed by: NURSE PRACTITIONER

## 2022-06-07 PROCEDURE — 83036 HEMOGLOBIN GLYCOSYLATED A1C: CPT | Performed by: NURSE PRACTITIONER

## 2022-06-07 PROCEDURE — 99214 OFFICE O/P EST MOD 30 MIN: CPT | Performed by: NURSE PRACTITIONER

## 2022-06-07 RX ORDER — PEN NEEDLE, DIABETIC 31 G X1/4"
1 NEEDLE, DISPOSABLE MISCELLANEOUS DAILY
Qty: 100 EACH | Refills: 3 | Status: SHIPPED | OUTPATIENT
Start: 2022-06-07

## 2022-06-07 RX ORDER — GLUCOSAMINE HCL/CHONDROITIN SU 500-400 MG
CAPSULE ORAL
Qty: 150 STRIP | Refills: 5 | Status: SHIPPED | OUTPATIENT
Start: 2022-06-07

## 2022-06-07 RX ORDER — LANCETS 23 GAUGE
EACH MISCELLANEOUS
Qty: 200 EACH | Refills: 11 | Status: SHIPPED | OUTPATIENT
Start: 2022-06-07

## 2022-06-07 RX ORDER — INSULIN DEGLUDEC INJECTION 100 U/ML
INJECTION, SOLUTION SUBCUTANEOUS
Qty: 3 PEN | Refills: 3 | Status: SHIPPED | OUTPATIENT
Start: 2022-06-07

## 2022-06-07 RX ORDER — LEVOTHYROXINE SODIUM 0.05 MG/1
TABLET ORAL
Qty: 110 TABLET | Refills: 3 | Status: SHIPPED | OUTPATIENT
Start: 2022-06-07

## 2022-06-07 RX ORDER — GLIPIZIDE 5 MG/1
5 TABLET, FILM COATED, EXTENDED RELEASE ORAL DAILY
Qty: 90 TABLET | Refills: 2 | Status: SHIPPED | OUTPATIENT
Start: 2022-06-07

## 2022-06-07 NOTE — PROGRESS NOTES
smokeless tobacco.  Alcohol:   reports no history of alcohol use. Drugs:   reports no history of drug use. FAMILY HISTORY   No family history on file. ALLERGIES AND DRUG REACTIONS   Allergies   Allergen Reactions    Demerol Hcl [Meperidine] Itching    Meperidine Hcl        CURRENT MEDICATIONS   Current Outpatient Medications   Medication Sig Dispense Refill    blood glucose monitor strips One-Touch Verio strips. Checks 2 times/day and as needed for symptoms of irregular blood glucose 150 strip 5    glipiZIDE (GLUCOTROL XL) 5 MG extended release tablet Take 1 tablet by mouth daily 90 tablet 2    metFORMIN (GLUCOPHAGE) 500 MG tablet Take 2 tablets by mouth 2 times daily (with meals) 360 tablet 2    SAFE-T-PRO LANCETS MISC Test 3 times daily 200 each 11    levothyroxine (SYNTHROID) 50 MCG tablet Take 1 tablet 6 days a week and 2 tablets on Sunday 110 tablet 3    Insulin Pen Needle ([employer] PEN NEEDLES) 31G X 6 MM MISC 1 each by Does not apply route daily 100 each 3    Insulin Degludec (TRESIBA FLEXTOUCH) 100 UNIT/ML SOPN Inject 10 units at HS 3 pen 3    Lancets MISC 1 each by Does not apply route 2 times daily 300 each 1    dicyclomine (BENTYL) 10 MG capsule Take 2 capsules by mouth 4 times daily 120 capsule 3    vitamin D (CHOLECALCIFEROL) 48999 UNIT CAPS Take 1 capsule weekly (Patient not taking: Reported on 6/7/2022) 8 capsule 0    Insulin Pen Needle (BD PEN NEEDLE JULIAN U/F) 32G X 4 MM MISC 1 each by Does not apply route daily 100 each 3     No current facility-administered medications for this visit. Review of Systems  Constitutional: No fever, no chills, no diaphoresis, no generalized weakness. HEENT: No blurred vision, No sore throat, no ear pain, no hair loss  Neck: denied any neck swelling, difficulty swallowing,   Cardio-pulmonary: No CP, SOB or palpitation, No orthopnea or PND. No cough or wheezing.   GI: No N/V/D, no constipation, No abdominal pain, no melena or hematochezia   : Denied any dysuria, hematuria, flank pain, discharge, or incontinence. Skin: denied any rash, ulcer, Hirsute, or hyperpigmentation. MSK: denied any joint deformity, joint pain/swelling, muscle pain, or back pain. Neuro: no numbness, no tingling, no weakness    OBJECTIVE    /86   Pulse 84   Ht 5' 4\" (1.626 m)   Wt 162 lb (73.5 kg)   SpO2 100%   BMI 27.81 kg/m²   BP Readings from Last 4 Encounters:   06/07/22 131/86   08/16/21 123/84   04/22/21 120/64   04/19/21 124/78     Wt Readings from Last 6 Encounters:   06/07/22 162 lb (73.5 kg)   08/16/21 183 lb (83 kg)   04/22/21 180 lb 6.4 oz (81.8 kg)   04/19/21 179 lb (81.2 kg)   02/22/21 188 lb (85.3 kg)   10/15/20 171 lb (77.6 kg)       Physical examination:  General: awake alert, oriented x3, no abnormal position or movements. HEENT: normocephalic non-traumatic, no exophthalmos   Neck: supple, no LN enlargement, no thyromegaly, no thyroid tenderness, no JVD. Pulm: Clear equal air entry no added sounds, no wheezing or rhonchi    CVS: S1 + S2, no murmur, no heave. Dorsalis pedis pulse palpable   Abd: soft lax, no tenderness, no organomegaly, audible bowel sounds. Skin: warm, no lesions, no rash.  No callus, no Ulcers, No acanthosis nigricans  Musculoskeletal: No back tenderness, no kyphosis/scoliosis    Neuro: CN intact,  Normal sensation bilateral , muscle power normal  Psych: normal mood, and affect      Review of Laboratory Data:  I personally reviewed the following lab:  Lab Results   Component Value Date/Time    WBC 8.6 11/11/2019 10:54 AM    RBC 4.97 11/11/2019 10:54 AM    HGB 13.1 11/11/2019 10:54 AM    HCT 41.8 11/11/2019 10:54 AM    MCV 84.1 11/11/2019 10:54 AM    MCH 26.4 11/11/2019 10:54 AM    MCHC 31.3 (L) 11/11/2019 10:54 AM    RDW 12.7 11/11/2019 10:54 AM     11/11/2019 10:54 AM    MPV 10.4 11/11/2019 10:54 AM      Lab Results   Component Value Date/Time     02/15/2021 12:40 PM    K 4.2 02/15/2021 12:40 PM    CO2 26 02/15/2021 12:40 PM    BUN 17 02/15/2021 12:40 PM    CREATININE 0.7 02/15/2021 12:40 PM    CALCIUM 9.4 02/15/2021 12:40 PM    LABGLOM >60 02/15/2021 12:40 PM    GFRAA >60 02/15/2021 12:40 PM      Lab Results   Component Value Date/Time    TSH 4.300 (H) 02/15/2021 12:40 PM    T4FREE 1.25 02/15/2021 12:40 PM     Lab Results   Component Value Date    LABA1C 14.8 06/07/2022    GLUCOSE 108 02/15/2021    MALBCR - 02/15/2021    LABMICR <12.0 02/15/2021    LABCREA 159 02/15/2021     Lab Results   Component Value Date    LABA1C 14.8 06/07/2022    LABA1C 8.8 08/16/2021    LABA1C 7.8 02/15/2021     Lab Results   Component Value Date    TRIG 61 11/11/2019    HDL 35 11/11/2019    LDLCALC 62 11/11/2019    CHOL 109 11/11/2019     Lab Results   Component Value Date    VITD25 17 02/15/2021     ASSESSMENT & RECOMMENDATIONS   Duane Patches, a 35 y.o.-old female seen in for the following issues     Diabetes Mellitus Type 2  With hyperglycmeia  · Worsening control, A1c  14.8% in the setting of dietary and medication noncompliance  · Will restart Metformin 1000 mg BID, Glipiizde ER 5 mg daily  · Will add Tresiba 10 units at HS  · [place] sample applied today for CGM  · The patient was advised to check blood sugars in am , before meals, and at hs and send BS readings to our office in a week. · Discussed with patient A1c and blood sugar goals   · Patient will need routine diabetes maintenance and prevention  · Labs before next visit     H/o Dietary noncompliance   Discussed with patient the importance of eating consistent carbohydrate meals, avoiding high glycemic index food.  Also, discussed with patient the risk and negative consequences of dietary noncompliance on blood glucose control, blood pressure and weight    Hypothyroidism  · On synthroid 50 mcg 1 tab 6 days a wk and 2 tab on Sunday   · TFT before next visit   · Will reorder as she has not taken replacement in 6 months    VIitamin D deficiency  · Pt at risk for deficiency  · Willl check surveillance level    I personally reviewed external notes from PCP and other patient's care team providers, and personally interpreted labs associated with the above diagnosis. I also ordered labs to further assess and manage the above addressed medical conditions    Return in about 3 months (around 9/7/2022) for DM type 2. The above issues were reviewed with the patient who understood and agreed with the plan. Thank you for allowing us to participate in the care of this patient. Please do not hesitate to contact us with any additional questions. Diagnosis Orders   1. Type 2 diabetes mellitus with hyperglycemia, without long-term current use of insulin (HCC)  POCT glycosylated hemoglobin (Hb A1C)    blood glucose monitor strips    glipiZIDE (GLUCOTROL XL) 5 MG extended release tablet    metFORMIN (GLUCOPHAGE) 500 MG tablet    SAFE-T-PRO LANCETS MISC    LIPID PANEL    MICROALBUMIN / CREATININE URINE RATIO    Basic Metabolic Panel    CBC    Insulin Pen Needle ([employer] PEN NEEDLES) 31G X 6 MM MISC    Insulin Degludec (TRESIBA FLEXTOUCH) 100 UNIT/ML SOPN   2. Hypothyroidism, unspecified type  TSH    T4, Free    levothyroxine (SYNTHROID) 50 MCG tablet   3. Vitamin D deficiency  Vitamin D 25 Hydroxy   4. Dietary noncompliance     5. Type 2 diabetes mellitus with other specified complication, unspecified whether long term insulin use ([facility] [address]  blood glucose monitor strips     BOB Torres NP    [facility]   [address] [place] [id number]   Phone: [phone]  Fax: [phone]  --------------------------------------------  An electronic signature was used to authenticate this note.  BOB Torres NP on 6/7/2022 at 11:39 AM

## 2022-06-08 ENCOUNTER — TELEPHONE (OUTPATIENT)
Dept: ENDOCRINOLOGY | Age: 42
End: 2022-06-08

## 2022-06-08 LAB
ANION GAP SERPL CALCULATED.3IONS-SCNC: 21 MMOL/L (ref 7–16)
BUN BLDV-MCNC: 13 MG/DL (ref 6–20)
CALCIUM SERPL-MCNC: 9.3 MG/DL (ref 8.6–10.2)
CHLORIDE BLD-SCNC: 99 MMOL/L (ref 98–107)
CHOLESTEROL, TOTAL: 112 MG/DL (ref 0–199)
CO2: 14 MMOL/L (ref 22–29)
CREAT SERPL-MCNC: 0.6 MG/DL (ref 0.5–1)
GFR AFRICAN AMERICAN: >60
GFR NON-AFRICAN AMERICAN: >60 ML/MIN/1.73
GLUCOSE BLD-MCNC: 320 MG/DL (ref 74–99)
HDLC SERPL-MCNC: 33 MG/DL
LDL CHOLESTEROL CALCULATED: 64 MG/DL (ref 0–99)
POTASSIUM SERPL-SCNC: 4.2 MMOL/L (ref 3.5–5)
SODIUM BLD-SCNC: 134 MMOL/L (ref 132–146)
T4 FREE: 1.1 NG/DL (ref 0.93–1.7)
TRIGL SERPL-MCNC: 73 MG/DL (ref 0–149)
TSH SERPL DL<=0.05 MIU/L-ACNC: 3.24 UIU/ML (ref 0.27–4.2)
VITAMIN D 25-HYDROXY: 30 NG/ML (ref 30–100)
VLDLC SERPL CALC-MCNC: 15 MG/DL

## 2022-06-08 NOTE — TELEPHONE ENCOUNTER
----- Message from BOB Neri NP sent at 6/8/2022  9:47 AM EDT -----  Please call pt and inform her that Vit D is at 30, she can take Vit D 1000 iu daily, Her TSH is normal but will start her levothyroxine and recheck in her levels in 6 weeks. Cholesterol panel is good except her HDL (good cholesterol is slightly low)   and no protein in her urine.  Kidney function is goof

## 2022-06-16 ENCOUNTER — TELEPHONE (OUTPATIENT)
Dept: ENDOCRINOLOGY | Age: 42
End: 2022-06-16

## 2022-06-16 NOTE — TELEPHONE ENCOUNTER
I spoke with pt after reviewing her Salvador.  Will hold Blaire Mckeon for now and re-eval BS in 1 week Orthopedic

## 2022-12-06 ENCOUNTER — OFFICE VISIT (OUTPATIENT)
Dept: ENDOCRINOLOGY | Age: 42
End: 2022-12-06
Payer: COMMERCIAL

## 2022-12-06 VITALS
HEART RATE: 51 BPM | SYSTOLIC BLOOD PRESSURE: 122 MMHG | BODY MASS INDEX: 29.82 KG/M2 | HEIGHT: 65 IN | DIASTOLIC BLOOD PRESSURE: 84 MMHG | WEIGHT: 179 LBS

## 2022-12-06 DIAGNOSIS — Z91.119 DIETARY NONCOMPLIANCE: ICD-10-CM

## 2022-12-06 DIAGNOSIS — E03.9 HYPOTHYROIDISM, UNSPECIFIED TYPE: ICD-10-CM

## 2022-12-06 DIAGNOSIS — E55.9 VITAMIN D DEFICIENCY: ICD-10-CM

## 2022-12-06 DIAGNOSIS — E11.9 TYPE 2 DIABETES MELLITUS WITHOUT COMPLICATION, WITHOUT LONG-TERM CURRENT USE OF INSULIN (HCC): Primary | ICD-10-CM

## 2022-12-06 LAB
HBA1C MFR BLD: 9.6 %
T4 FREE: 1.18 NG/DL (ref 0.93–1.7)
TSH SERPL DL<=0.05 MIU/L-ACNC: 3.53 UIU/ML (ref 0.27–4.2)

## 2022-12-06 PROCEDURE — 83036 HEMOGLOBIN GLYCOSYLATED A1C: CPT | Performed by: NURSE PRACTITIONER

## 2022-12-06 PROCEDURE — 99214 OFFICE O/P EST MOD 30 MIN: CPT | Performed by: NURSE PRACTITIONER

## 2022-12-06 PROCEDURE — 3046F HEMOGLOBIN A1C LEVEL >9.0%: CPT | Performed by: NURSE PRACTITIONER

## 2022-12-06 RX ORDER — FLASH GLUCOSE SENSOR
KIT MISCELLANEOUS
Qty: 2 EACH | Refills: 3 | Status: SHIPPED | OUTPATIENT
Start: 2022-12-06

## 2022-12-06 NOTE — PROGRESS NOTES
[address] [place] [address] Department of Endocrinology Diabetes and Metabolism   [address] [facility] [address]   Phone: [phone]  Fax: [phone]    Date of Service: 12/6/2022'  Primary Care Physician: Avis Zaragoza MD  Provider: Lawrence Gibson MD     Reason for the visit:    DM type 2     History of Present Illness: The history is provided by the patient. No  was used. Accuracy of the patient data is excellent. Luis Hernandez is a very pleasant 38 y.o. female seen today for diabetes management     Luis Hernandez was diagnosed with diabetes at age 27years old and was currently ordered Metformin 1000 mg BID and glipizide 5 mg daily was on Tresiba 10 units at [facility]     Did trial Jose Kaye   The patient has not been checking her BS  Most recent A1c results summarized below  Lab Results   Component Value Date/Time    LABA1C 9.6 12/06/2022 10:02 AM    LABA1C 14.8 06/07/2022 10:50 AM    LABA1C 8.8 08/16/2021 01:06 PM       Patient has had no hypoglycemic episodes   The patient admit that recent she hasn't been mindful of what has been eating and wasn't following diabetic diet    I reviewed current medications and the patient has no issues with diabetes medications and taking them regularly. Luis Hernandez had an eye exam 12/5/22.  no h/o diabetic retinopathy  Microvascular complications:  No Retinopathy, Nephropathy or Neuropathy   Macrovascular complications: no CAD, PVD, or Stroke  The patient receives has not received flu shot this year. PAST MEDICAL HISTORY   Past Medical History:   Diagnosis Date    Diabetes mellitus ([address]     Gestational    Hypothyroidism 4/22/2021    Pancreatitis 1993       PAST SURGICAL HISTORY   Past Surgical History:   Procedure Laterality Date    CHOLECYSTECTOMY      PANCREAS SURGERY N/A 2003    Bypass intestines through pancreas       SOCIAL HISTORY   Tobacco:   reports that she has never smoked.  She has never used smokeless tobacco.  Alcohol:   reports no history of alcohol use. Drugs:   reports no history of drug use. FAMILY HISTORY   No family history on file. ALLERGIES AND DRUG REACTIONS   Allergies   Allergen Reactions    Demerol Hcl [Meperidine] Itching    Meperidine Hcl        CURRENT MEDICATIONS   Current Outpatient Medications   Medication Sig Dispense Refill    blood glucose monitor strips One-Touch Verio strips. Checks 2 times/day and as needed for symptoms of irregular blood glucose 150 strip 5    glipiZIDE (GLUCOTROL XL) 5 MG extended release tablet Take 1 tablet by mouth daily 90 tablet 2    metFORMIN (GLUCOPHAGE) 500 MG tablet Take 2 tablets by mouth 2 times daily (with meals) 360 tablet 2    SAFE-T-PRO LANCETS MISC Test 3 times daily 200 each 11    levothyroxine (SYNTHROID) 50 MCG tablet Take 1 tablet 6 days a week and 2 tablets on Sunday 110 tablet 3    Lancets MISC 1 each by Does not apply route 2 times daily 300 each 1    Insulin Pen Needle (BD PEN NEEDLE JULIAN U/F) 32G X 4 MM MISC 1 each by Does not apply route daily 100 each 3    Insulin Degludec (TRESIBA FLEXTOUCH) 100 UNIT/ML SOPN Inject 10 units at HS (Patient not taking: Reported on 12/6/2022) 3 pen 3    dicyclomine (BENTYL) 10 MG capsule Take 2 capsules by mouth 4 times daily 120 capsule 3     No current facility-administered medications for this visit. Review of Systems  Constitutional: No fever, no chills, no diaphoresis, no generalized weakness. HEENT: No blurred vision, No sore throat, no ear pain, no hair loss  Neck: denied any neck swelling, difficulty swallowing,   Cardio-pulmonary: No CP, SOB or palpitation, No orthopnea or PND. No cough or wheezing. GI: No N/V/D, no constipation, No abdominal pain, no melena or hematochezia   : Denied any dysuria, hematuria, flank pain, discharge, or incontinence. Skin: denied any rash, ulcer, Hirsute, or hyperpigmentation.    MSK: denied any joint deformity, joint pain/swelling, muscle pain, or back pain.  Neuro: no numbness, no tingling, no weakness    OBJECTIVE    /84   Pulse 51   Ht 5' 5\" (1.651 m)   Wt 179 lb (81.2 kg)   LMP  (LMP Unknown)   BMI 29.79 kg/m²   BP Readings from Last 4 Encounters:   12/06/22 122/84   06/07/22 131/86   08/16/21 123/84   04/22/21 120/64     Wt Readings from Last 6 Encounters:   12/06/22 179 lb (81.2 kg)   06/07/22 162 lb (73.5 kg)   08/16/21 183 lb (83 kg)   04/22/21 180 lb 6.4 oz (81.8 kg)   04/19/21 179 lb (81.2 kg)   02/22/21 188 lb (85.3 kg)       Physical examination:  General: awake alert, oriented x3, no abnormal position or movements. HEENT: normocephalic non-traumatic, no exophthalmos   Neck: supple, no LN enlargement, no thyromegaly, no thyroid tenderness, no JVD. Pulm: Clear equal air entry no added sounds, no wheezing or rhonchi    CVS: S1 + S2, no murmur, no heave. Dorsalis pedis pulse palpable   Abd: soft lax, no tenderness, no organomegaly, audible bowel sounds. Skin: warm, no lesions, no rash.  No callus, no Ulcers, No acanthosis nigricans  Musculoskeletal: No back tenderness, no kyphosis/scoliosis    Neuro: CN intact,  Normal sensation bilateral , muscle power normal  Psych: normal mood, and affect      Review of Laboratory Data:  I personally reviewed the following lab:  Lab Results   Component Value Date/Time    WBC 8.0 06/07/2022 11:59 AM    RBC 4.55 06/07/2022 11:59 AM    HGB 11.6 06/07/2022 11:59 AM    HCT 38.3 06/07/2022 11:59 AM    MCV 84.2 06/07/2022 11:59 AM    MCH 25.5 (L) 06/07/2022 11:59 AM    MCHC 30.3 (L) 06/07/2022 11:59 AM    RDW 12.9 06/07/2022 11:59 AM     06/07/2022 11:59 AM    MPV 10.2 06/07/2022 11:59 AM      Lab Results   Component Value Date/Time     06/07/2022 11:59 AM    K 4.2 06/07/2022 11:59 AM    CO2 14 (L) 06/07/2022 11:59 AM    BUN 13 06/07/2022 11:59 AM    CREATININE 0.6 06/07/2022 11:59 AM    CALCIUM 9.3 06/07/2022 11:59 AM    LABGLOM >60 06/07/2022 11:59 AM    GFRAA >60 06/07/2022 11:59 AM      Lab Results   Component Value Date/Time    TSH 3.240 06/07/2022 11:59 AM    T4FREE 1.10 06/07/2022 11:59 AM     Lab Results   Component Value Date/Time    LABA1C 9.6 12/06/2022 10:02 AM    GLUCOSE 320 06/07/2022 11:59 AM    MALBCR - 06/07/2022 11:58 AM    LABMICR <12.0 06/07/2022 11:58 AM    LABCREA 43 06/07/2022 11:58 AM     Lab Results   Component Value Date/Time    LABA1C 9.6 12/06/2022 10:02 AM    LABA1C 14.8 06/07/2022 10:50 AM    LABA1C 8.8 08/16/2021 01:06 PM     Lab Results   Component Value Date/Time    TRIG 73 06/07/2022 11:59 AM    HDL 33 06/07/2022 11:59 AM    LDLCALC 64 06/07/2022 11:59 AM    CHOL 112 06/07/2022 11:59 AM     Lab Results   Component Value Date/Time    VITD25 30 06/07/2022 11:59 AM    VITD25 17 02/15/2021 12:40 PM     ASSESSMENT & RECOMMENDATIONS   Nell Hernandez, a 38 y.o.-old female seen in for the following issues     Diabetes Mellitus Type 2  With hyperglycmeia  Improving control, A1c  14.8% - > 9.6% still elevated as she only took [place] for 2 weeks (she thought was a trial)  Will restart Metformin 1000 mg BID, Glipiizde ER 5 mg daily  Will likely resume [place] 10 units at HS once BS data has been reviewed  Zeyad sample applied today for CGM  Will order [facility]   The patient was advised to check blood sugars in am , before meals, and at hs and send BS readings to our office in a week. Discussed with patient A1c and blood sugar goals   Patient will need routine diabetes maintenance and prevention  Labs before next visit     H/o Dietary noncompliance  Discussed with patient the importance of eating consistent carbohydrate meals, avoiding high glycemic index food.  Also, discussed with patient the risk and negative consequences of dietary noncompliance on blood glucose control, blood pressure and weight    Hypothyroidism  Pt stopped synthroid 50 mcg 1 tab 6 days a wk and 2 tab on Sunday   TFT reorder   She has not taken replacement in 6 months  She has no symptoms of hair loss, fatigue, weight gain/loss, feeling of being cold/hot    VIitamin D deficiency  Level at 30  Advise to start Vit D 1000 iu daily     I personally reviewed external notes from PCP and other patient's care team providers, and personally interpreted labs associated with the above diagnosis. I also ordered labs to further assess and manage the above addressed medical conditions    Return in about 2 months (around 2/6/2023) for Type 2 DM. The above issues were reviewed with the patient who understood and agreed with the plan. Thank you for allowing us to participate in the care of this patient. Please do not hesitate to contact us with any additional questions. Diagnosis Orders   1. Type 2 diabetes mellitus without complication, without long-term current use of insulin (HCC)  POCT glycosylated hemoglobin (Hb A1C)      2. Dietary noncompliance        3. Hypothyroidism, unspecified type  TSH    T4, Free      4. Vitamin D deficiency          BOB Arredondo NP    [facility] Diabetes Care and Endocrinology   [address] [place] [address]   Phone: [phone]  Fax: [phone]  --------------------------------------------  An electronic signature was used to authenticate this note.  BOB Arredondo NP on 12/6/2022 at 10:17 AM

## 2022-12-07 ENCOUNTER — TELEPHONE (OUTPATIENT)
Dept: ENDOCRINOLOGY | Age: 42
End: 2022-12-07

## 2022-12-07 NOTE — TELEPHONE ENCOUNTER
Please call pt and inform her that I have reviewed her TFT, They are at goal. We will continue to monitor

## 2022-12-12 ENCOUNTER — TELEPHONE (OUTPATIENT)
Dept: ENDOCRINOLOGY | Age: 42
End: 2022-12-12

## 2022-12-12 DIAGNOSIS — E11.9 TYPE 2 DIABETES MELLITUS WITHOUT COMPLICATION, WITHOUT LONG-TERM CURRENT USE OF INSULIN (HCC): Primary | ICD-10-CM

## 2022-12-12 DIAGNOSIS — E11.65 TYPE 2 DIABETES MELLITUS WITH HYPERGLYCEMIA, WITHOUT LONG-TERM CURRENT USE OF INSULIN (HCC): ICD-10-CM

## 2022-12-12 PROCEDURE — 95251 CONT GLUC MNTR ANALYSIS I&R: CPT | Performed by: NURSE PRACTITIONER

## 2022-12-12 NOTE — TELEPHONE ENCOUNTER
Attached is the pt's [employer]. [facility] spoke to the pt and raised her Glipizide to 10mg and she is to continue holding her long-acting insulin.

## 2022-12-12 NOTE — PROGRESS NOTES
Trial taking glipizide 10 mg in am, consume a 15-20 CHO snack before bed, Hold long acting insulin for now. I reviewed [place] and spoke with pt.

## 2023-02-22 ENCOUNTER — OFFICE VISIT (OUTPATIENT)
Dept: ENDOCRINOLOGY | Age: 43
End: 2023-02-22

## 2023-02-22 VITALS
BODY MASS INDEX: 30.32 KG/M2 | HEART RATE: 74 BPM | HEIGHT: 65 IN | WEIGHT: 182 LBS | SYSTOLIC BLOOD PRESSURE: 115 MMHG | DIASTOLIC BLOOD PRESSURE: 80 MMHG

## 2023-02-22 DIAGNOSIS — E11.65 TYPE 2 DIABETES MELLITUS WITH HYPERGLYCEMIA, WITHOUT LONG-TERM CURRENT USE OF INSULIN (HCC): Primary | ICD-10-CM

## 2023-02-22 DIAGNOSIS — E55.9 VITAMIN D DEFICIENCY: ICD-10-CM

## 2023-02-22 DIAGNOSIS — Z91.119 DIETARY NONCOMPLIANCE: ICD-10-CM

## 2023-02-22 DIAGNOSIS — E03.9 HYPOTHYROIDISM, UNSPECIFIED TYPE: ICD-10-CM

## 2023-02-22 LAB — HBA1C MFR BLD: 7.5 %

## 2023-02-22 RX ORDER — GLIPIZIDE 5 MG/1
10 TABLET, FILM COATED, EXTENDED RELEASE ORAL DAILY
Qty: 30 TABLET | Refills: 3 | Status: SHIPPED | OUTPATIENT
Start: 2023-02-22

## 2023-02-22 NOTE — PROGRESS NOTES
[address] [place] [address] Department of Endocrinology Diabetes and Metabolism   [address] [facility] [address]   Phone: [phone]  Fax: [phone]    Date of Service: 2/22/2023'  Primary Care Physician: Tracie Clarke MD  Provider: ,BOB Arthur NP      Reason for the visit:    DM type 2     History of Present Illness: The history is provided by the patient. No  was used. Accuracy of the patient data is excellent. Stella Castro is a very pleasant 38 y.o. female seen today for diabetes management     Stella Castro was diagnosed with diabetes at age 27years old and was currently ordered Metformin 1000 mg BID and glipizide 10 mg daily was on Tresiba 10 units at night     Avoid GLP-1 due to hx of pancreatitis     Zeyad 2  TIR  72%  Hyperglycemia 28%  Low 0%  Avg BS  166    Most recent A1c results summarized below  Lab Results   Component Value Date/Time    LABA1C 7.5 02/22/2023 09:48 AM    LABA1C 9.6 12/06/2022 10:02 AM    LABA1C 14.8 06/07/2022 10:50 AM     Patient has had no hypoglycemic episodes   The patient admit that recent she has been mindful of what has been eating and  following diabetic diet    I reviewed current medications and the patient has no issues with diabetes medications and taking them regularly.    Stella Castro had an eye exam 12/5/22.  no h/o diabetic retinopathy  Microvascular complications:  No Retinopathy, Nephropathy or Neuropathy   Macrovascular complications: no CAD, PVD, or Stroke  The patient receives has not received flu shot this year.    + HX of pancreatitis  No Hx of MTC  No HX of gastroparesis   No HX of UTI/Mycotic infection       PAST MEDICAL HISTORY   Past Medical History:   Diagnosis Date    Diabetes mellitus ([address]     Gestational    Hypothyroidism 4/22/2021    Pancreatitis 1993       PAST SURGICAL HISTORY   Past Surgical History:   Procedure Laterality Date    CHOLECYSTECTOMY      PANCREAS SURGERY N/A 2003    Bypass intestines through pancreas       SOCIAL HISTORY   Tobacco:   reports that she has never smoked. She has never used smokeless tobacco.  Alcohol:   reports no history of alcohol use. Drugs:   reports no history of drug use. FAMILY HISTORY   No family history on file. ALLERGIES AND DRUG REACTIONS   Allergies   Allergen Reactions    Demerol Hcl [Meperidine] Itching    Meperidine Hcl        CURRENT MEDICATIONS   Current Outpatient Medications   Medication Sig Dispense Refill    glipiZIDE (GLUCOTROL XL) 5 MG extended release tablet Take 2 tablets by mouth daily 30 tablet 3    metFORMIN (GLUCOPHAGE) 500 MG tablet Take 2 tablets by mouth 2 times daily (with meals) 360 tablet 2    Continuous Blood Gluc Sensor (FREESTYLE LOUISA 2 SENSOR) MISC Apply every 14 days 2 each 3    blood glucose monitor strips One-Touch Verio strips. Checks 2 times/day and as needed for symptoms of irregular blood glucose 150 strip 5    SAFE-T-PRO LANCETS MISC Test 3 times daily 200 each 11    Lancets MISC 1 each by Does not apply route 2 times daily 300 each 1    Insulin Pen Needle (BD PEN NEEDLE JULIAN U/F) 32G X 4 MM MISC 1 each by Does not apply route daily 100 each 3    Insulin Degludec (TRESIBA FLEXTOUCH) 100 UNIT/ML SOPN Inject 10 units at HS (Patient not taking: No sig reported) 3 pen 3    dicyclomine (BENTYL) 10 MG capsule Take 2 capsules by mouth 4 times daily (Patient not taking: Reported on 2/22/2023) 120 capsule 3     No current facility-administered medications for this visit. Review of Systems  Constitutional: No fever, no chills, no diaphoresis, no generalized weakness. HEENT: No blurred vision, No sore throat, no ear pain, no hair loss  Neck: denied any neck swelling, difficulty swallowing,   Cardio-pulmonary: No CP, SOB or palpitation, No orthopnea or PND. No cough or wheezing.   GI: No N/V/D, no constipation, No abdominal pain, no melena or hematochezia   : Denied any dysuria, hematuria, flank pain, discharge, or incontinence. Skin: denied any rash, ulcer, Hirsute, or hyperpigmentation. MSK: denied any joint deformity, joint pain/swelling, muscle pain, or back pain. Neuro: no numbness, no tingling, no weakness    OBJECTIVE    /80   Pulse 74   Ht 5' 5\" (1.651 m)   Wt 182 lb (82.6 kg)   LMP  (LMP Unknown)   BMI 30.29 kg/m²   BP Readings from Last 4 Encounters:   02/22/23 115/80   12/06/22 122/84   06/07/22 131/86   08/16/21 123/84     Wt Readings from Last 6 Encounters:   02/22/23 182 lb (82.6 kg)   12/06/22 179 lb (81.2 kg)   06/07/22 162 lb (73.5 kg)   08/16/21 183 lb (83 kg)   04/22/21 180 lb 6.4 oz (81.8 kg)   04/19/21 179 lb (81.2 kg)       Physical examination:  General: awake alert, oriented x3, no abnormal position or movements. HEENT: normocephalic non-traumatic, no exophthalmos   Neck: supple, no LN enlargement, no thyromegaly, no thyroid tenderness, no JVD. Pulm: Clear equal air entry no added sounds, no wheezing or rhonchi    CVS: S1 + S2, no murmur, no heave. Dorsalis pedis pulse palpable   Abd: soft lax, no tenderness, no organomegaly, audible bowel sounds. Skin: warm, no lesions, no rash.  No callus, no Ulcers, No acanthosis nigricans  Musculoskeletal: No back tenderness, no kyphosis/scoliosis    Neuro: CN intact,  Normal sensation bilateral , muscle power normal  Psych: normal mood, and affect      Review of Laboratory Data:  I personally reviewed the following lab:  Lab Results   Component Value Date/Time    WBC 8.0 06/07/2022 11:59 AM    RBC 4.55 06/07/2022 11:59 AM    HGB 11.6 06/07/2022 11:59 AM    HCT 38.3 06/07/2022 11:59 AM    MCV 84.2 06/07/2022 11:59 AM    MCH 25.5 (L) 06/07/2022 11:59 AM    MCHC 30.3 (L) 06/07/2022 11:59 AM    RDW 12.9 06/07/2022 11:59 AM     06/07/2022 11:59 AM    MPV 10.2 06/07/2022 11:59 AM      Lab Results   Component Value Date/Time     06/07/2022 11:59 AM    K 4.2 06/07/2022 11:59 AM    CO2 14 (L) 06/07/2022 11:59 AM    BUN 13 06/07/2022 11:59 AM    CREATININE 0.6 06/07/2022 11:59 AM    CALCIUM 9.3 06/07/2022 11:59 AM    LABGLOM >60 06/07/2022 11:59 AM    GFRAA >60 06/07/2022 11:59 AM      Lab Results   Component Value Date/Time    TSH 3.530 12/06/2022 10:36 AM    T4FREE 1.18 12/06/2022 10:36 AM     Lab Results   Component Value Date/Time    LABA1C 7.5 02/22/2023 09:48 AM    GLUCOSE 320 06/07/2022 11:59 AM    MALBCR - 06/07/2022 11:58 AM    LABMICR <12.0 06/07/2022 11:58 AM    LABCREA 43 06/07/2022 11:58 AM     Lab Results   Component Value Date/Time    LABA1C 7.5 02/22/2023 09:48 AM    LABA1C 9.6 12/06/2022 10:02 AM    LABA1C 14.8 06/07/2022 10:50 AM     Lab Results   Component Value Date/Time    TRIG 73 06/07/2022 11:59 AM    HDL 33 06/07/2022 11:59 AM    LDLCALC 64 06/07/2022 11:59 AM    CHOL 112 06/07/2022 11:59 AM     Lab Results   Component Value Date/Time    VITD25 30 06/07/2022 11:59 AM    VITD25 17 02/15/2021 12:40 PM     ASSESSMENT & RECOMMENDATIONS   Alissa Xavier, a 38 y.o.-old female seen in for the following issues     Diabetes Mellitus Type 2  With hyperglycmeia  Improving control, A1c  14.8% - > 9.6% -> 7.5%   BS are improving and pt congratulated   Will continue Metformin 1000 mg BID, Glipizide ER 10 mg daily  Sample of jardiance 10 mg daily given and will await response, side effects discussed  Avoid GLP-1 due to hx of pancreatitis  The patient was advised to check blood sugars in am , before meals, and at hs and send BS readings to our office in 2weeks   Discussed with patient A1c and blood sugar goals   Patient will need routine diabetes maintenance and prevention  Labs before next visit     Continuous Glucose Monitoring (CGM) download and interpretation   I personally reviewed and interpreted continuous glucose monitor (CGM) download.  CGM report was discussed with patient including blood glucose patterns, percentages of blood glucose at goal, above goal and below goal. Insulin dosages/antidiabetic regimen was adjusted according to CGM download. Full CGM was scanned under media. H/o Dietary noncompliance  Discussed with patient the importance of eating consistent carbohydrate meals, avoiding high glycemic index food. Also, discussed with patient the risk and negative consequences of dietary noncompliance on blood glucose control, blood pressure and weight    Hypothyroidism  Pt stopped synthroid 50 mcg 1 tab 6 days a wk and 2 tab on Sunday awhile ago   TFT reorder   She has not taken replacement in 6 months  She has no symptoms of hair loss, fatigue, weight gain/loss, feeling of being cold/hot    VIitamin D deficiency  Last Level at 30  Not on replacement     I personally reviewed external notes from PCP and other patient's care team providers, and personally interpreted labs associated with the above diagnosis. I also ordered labs to further assess and manage the above addressed medical conditions    No follow-ups on file. The above issues were reviewed with the patient who understood and agreed with the plan. Thank you for allowing us to participate in the care of this patient. Please do not hesitate to contact us with any additional questions. Diagnosis Orders   1. Type 2 diabetes mellitus with hyperglycemia, without long-term current use of insulin (HCC)  CBC    Basic Metabolic Panel    LIPID PANEL    MICROALBUMIN / CREATININE URINE RATIO    glipiZIDE (GLUCOTROL XL) 5 MG extended release tablet    metFORMIN (GLUCOPHAGE) 500 MG tablet    [place] CONTINUOUS GLUCOSE MONITORING ANALYSIS I&R    POCT glycosylated hemoglobin (Hb A1C)      2. Dietary noncompliance        3. Hypothyroidism, unspecified type  TSH    T4, Free      4.  Vitamin D deficiency  Vitamin D 25 Hydroxy          BOB Medeiros NP [facility] Diabetes Care and Endocrinology   [address] [facility] [address]   Phone: [phone]  Fax: [phone]  --------------------------------------------  An electronic signature was used to authenticate this note.  [address], BOB - NP on 2/22/2023 at 9:48 AM

## 2023-02-23 ENCOUNTER — TELEPHONE (OUTPATIENT)
Dept: ENDOCRINOLOGY | Age: 43
End: 2023-02-23

## 2023-02-23 NOTE — TELEPHONE ENCOUNTER
Meenakshi Gee called in and asked if she should take the [place] with or in place of her other diabetic meds. Looking at [facility] notes. She is to continue her Metformin and glipizide and add the Jardiance. I called and informed Meenakshi Gee of this and she stated understanding. Will start this today.

## 2023-03-15 ENCOUNTER — OFFICE VISIT (OUTPATIENT)
Dept: FAMILY MEDICINE CLINIC | Age: 43
End: 2023-03-15
Payer: COMMERCIAL

## 2023-03-15 VITALS
RESPIRATION RATE: 15 BRPM | WEIGHT: 180 LBS | HEIGHT: 65 IN | SYSTOLIC BLOOD PRESSURE: 112 MMHG | TEMPERATURE: 97.8 F | HEART RATE: 77 BPM | BODY MASS INDEX: 29.99 KG/M2 | DIASTOLIC BLOOD PRESSURE: 68 MMHG | OXYGEN SATURATION: 98 %

## 2023-03-15 DIAGNOSIS — J01.90 ACUTE NON-RECURRENT SINUSITIS, UNSPECIFIED LOCATION: Primary | ICD-10-CM

## 2023-03-15 PROCEDURE — 99213 OFFICE O/P EST LOW 20 MIN: CPT | Performed by: FAMILY MEDICINE

## 2023-03-15 RX ORDER — AMOXICILLIN AND CLAVULANATE POTASSIUM 875; 125 MG/1; MG/1
1 TABLET, FILM COATED ORAL 2 TIMES DAILY
Qty: 20 TABLET | Refills: 0 | Status: SHIPPED | OUTPATIENT
Start: 2023-03-15 | End: 2023-03-25

## 2023-03-15 ASSESSMENT — PATIENT HEALTH QUESTIONNAIRE - PHQ9
SUM OF ALL RESPONSES TO PHQ QUESTIONS 1-9: 0
SUM OF ALL RESPONSES TO PHQ QUESTIONS 1-9: 0
SUM OF ALL RESPONSES TO PHQ9 QUESTIONS 1 & 2: 0
SUM OF ALL RESPONSES TO PHQ QUESTIONS 1-9: 0
2. FEELING DOWN, DEPRESSED OR HOPELESS: 0
SUM OF ALL RESPONSES TO PHQ QUESTIONS 1-9: 0
1. LITTLE INTEREST OR PLEASURE IN DOING THINGS: 0

## 2023-03-15 NOTE — PROGRESS NOTES
Nguyen Kelley In    [place] presents to the office today for   Chief Complaint   Patient presents with    Congestion    Otalgia     Here for congestion and ear pain  Son w strep a few weeks ago  She started getting sick 1 week ago  Sore throat and swollen  Did not want strep test today  Now with sinus congestion and pain  No n/v/d  No fever    Review of Systems     /68   Pulse 77   Temp 97.8 °F (36.6 °C) (Temporal)   Resp 15   Ht 5' 5\" (1.651 m)   Wt 180 lb (81.6 kg)   LMP  (LMP Unknown)   SpO2 98%   BMI 29.95 kg/m²   Physical Exam  Constitutional:       Appearance: Normal appearance. HENT:      Head: Normocephalic and atraumatic. Nose: Congestion present. Mouth/Throat:      Mouth: Mucous membranes are moist.      Pharynx: Posterior oropharyngeal erythema present. Eyes:      Extraocular Movements: Extraocular movements intact. Conjunctiva/sclera: Conjunctivae normal.   Cardiovascular:      Rate and Rhythm: Normal rate. Heart sounds: Normal heart sounds. Pulmonary:      Effort: Pulmonary effort is normal.      Breath sounds: Normal breath sounds. Skin:     General: Skin is warm. Neurological:      Mental Status: She is alert and oriented to person, place, and time. Psychiatric:         Mood and Affect: Mood normal.         Behavior: Behavior normal.          Current Outpatient Medications:     amoxicillin-clavulanate (AUGMENTIN) 875-125 MG per tablet, Take 1 tablet by mouth 2 times daily for 10 days, Disp: 20 tablet, Rfl: 0    glipiZIDE (GLUCOTROL XL) 5 MG extended release tablet, Take 2 tablets by mouth daily, Disp: 30 tablet, Rfl: 3    metFORMIN (GLUCOPHAGE) 500 MG tablet, Take 2 tablets by mouth 2 times daily (with meals), Disp: 360 tablet, Rfl: 2    blood glucose monitor strips, One-Touch Verio strips.  Checks 2 times/day and as needed for symptoms of irregular blood glucose, Disp: 150 strip, Rfl: 5    SAFE-T-PRO LANCETS MISC, Test 3 times daily, Disp: 200 each, Rfl: 11    Continuous Blood Gluc Sensor (FREESTYLE LOUISA 2 SENSOR) MISC, Apply every 14 days, Disp: 2 each, Rfl: 3    Lancets MISC, 1 each by Does not apply route 2 times daily, Disp: 300 each, Rfl: 1     Past Medical History:   Diagnosis Date    Diabetes mellitus (HCC)     Gestational    Hypothyroidism 4/22/2021    Pancreatitis 1993       Parvin was seen today for congestion and otalgia.    Diagnoses and all orders for this visit:    Acute non-recurrent sinusitis, unspecified location  -     amoxicillin-clavulanate (AUGMENTIN) 875-125 MG per tablet; Take 1 tablet by mouth 2 times daily for 10 days       DESI MCDANIEL MD

## 2023-04-30 DIAGNOSIS — E11.65 TYPE 2 DIABETES MELLITUS WITH HYPERGLYCEMIA, WITHOUT LONG-TERM CURRENT USE OF INSULIN (HCC): ICD-10-CM

## 2023-05-02 RX ORDER — GLIPIZIDE 5 MG/1
TABLET, FILM COATED, EXTENDED RELEASE ORAL
Qty: 60 TABLET | Refills: 5 | Status: SHIPPED | OUTPATIENT
Start: 2023-05-02

## 2023-08-11 ENCOUNTER — OFFICE VISIT (OUTPATIENT)
Dept: FAMILY MEDICINE CLINIC | Age: 43
End: 2023-08-11
Payer: COMMERCIAL

## 2023-08-11 VITALS
SYSTOLIC BLOOD PRESSURE: 118 MMHG | HEART RATE: 79 BPM | TEMPERATURE: 97.6 F | OXYGEN SATURATION: 99 % | HEIGHT: 65 IN | DIASTOLIC BLOOD PRESSURE: 76 MMHG | BODY MASS INDEX: 28.52 KG/M2 | WEIGHT: 171.2 LBS | RESPIRATION RATE: 17 BRPM

## 2023-08-11 DIAGNOSIS — B34.9 VIRAL SYNDROME: Primary | ICD-10-CM

## 2023-08-11 DIAGNOSIS — R42 DIZZINESS: ICD-10-CM

## 2023-08-11 DIAGNOSIS — R11.0 NAUSEA: ICD-10-CM

## 2023-08-11 PROCEDURE — 99213 OFFICE O/P EST LOW 20 MIN: CPT | Performed by: PHYSICIAN ASSISTANT

## 2023-08-11 RX ORDER — MECLIZINE HYDROCHLORIDE 25 MG/1
25 TABLET ORAL 3 TIMES DAILY PRN
Qty: 15 TABLET | Refills: 0 | Status: SHIPPED | OUTPATIENT
Start: 2023-08-11 | End: 2023-08-21

## 2024-03-01 LAB — DIABETIC RETINOPATHY: NEGATIVE

## 2024-05-02 ENCOUNTER — TELEPHONE (OUTPATIENT)
Dept: FAMILY MEDICINE CLINIC | Age: 44
End: 2024-05-02

## 2024-05-02 NOTE — TELEPHONE ENCOUNTER
Left vm for patient to return call.    Patient has not been seen for more than a year. Is patient still seeing dr cordero. If so patient needs scheduled for yearly appt and recheck of chronic conditions.

## 2024-06-03 ENCOUNTER — OFFICE VISIT (OUTPATIENT)
Dept: ENDOCRINOLOGY | Age: 44
End: 2024-06-03
Payer: COMMERCIAL

## 2024-06-03 VITALS — BODY MASS INDEX: 30.12 KG/M2 | WEIGHT: 181 LBS

## 2024-06-03 DIAGNOSIS — Z91.119 DIETARY NONCOMPLIANCE: ICD-10-CM

## 2024-06-03 DIAGNOSIS — E55.9 VITAMIN D DEFICIENCY: ICD-10-CM

## 2024-06-03 DIAGNOSIS — E11.65 TYPE 2 DIABETES MELLITUS WITH HYPERGLYCEMIA, WITHOUT LONG-TERM CURRENT USE OF INSULIN (HCC): ICD-10-CM

## 2024-06-03 DIAGNOSIS — E03.9 HYPOTHYROIDISM, UNSPECIFIED TYPE: ICD-10-CM

## 2024-06-03 PROCEDURE — 95251 CONT GLUC MNTR ANALYSIS I&R: CPT | Performed by: NURSE PRACTITIONER

## 2024-06-03 PROCEDURE — 99214 OFFICE O/P EST MOD 30 MIN: CPT | Performed by: NURSE PRACTITIONER

## 2024-06-03 RX ORDER — GLIPIZIDE 10 MG/1
10 TABLET, FILM COATED, EXTENDED RELEASE ORAL DAILY
Qty: 30 TABLET | Refills: 3 | Status: SHIPPED | OUTPATIENT
Start: 2024-06-03

## 2024-06-03 RX ORDER — BLOOD-GLUCOSE SENSOR
EACH MISCELLANEOUS
Qty: 6 EACH | Refills: 4 | Status: SHIPPED | OUTPATIENT
Start: 2024-06-03

## 2024-06-03 RX ORDER — LEVOTHYROXINE SODIUM 0.05 MG/1
TABLET ORAL
Qty: 34 TABLET | Refills: 2 | Status: SHIPPED | OUTPATIENT
Start: 2024-06-03

## 2024-06-03 NOTE — PROGRESS NOTES
[facility] [employer]  [facility] Department of Endocrinology Diabetes and Metabolism   [address], [place] [id number]  Phone: [phone]  Fax: [phone]    Date of Service: 6/3/2024'  Primary Care Physician: Esther Solo MD  Provider: ,BOB Medeiros NP      Reason for the visit:    DM type 2     History of Present Illness:  The history is provided by the patient. No  was used. Accuracy of the patient data is excellent.  Parvin Navarro is a very pleasant 43 y.o. female seen today for diabetes management     Last OV  2/2023    Parvin Navarro was diagnosed with diabetes at age 32 years old and was currently ordered Metformin 1000 mg BID and glipizide 10 mg daily     Previously on  Tresiba 10 units at night, jardiance 10 mg daily  (samples given)     Avoid GLP-1 due to hx of pancreatitis   Previous use of Zeyad, not wearing currently    Does check BS on occasion   Per recall 200      Most recent A1c results summarized below  Lab Results   Component Value Date/Time    LABA1C 7.5 02/22/2023 09:48 AM    LABA1C 9.6 12/06/2022 10:02 AM    LABA1C 14.8 06/07/2022 10:50 AM     Patient has had no hypoglycemic episodes   The patient admit that recent she has been mindful of what has been eating and  following diabetic diet    I reviewed current medications and the patient has no issues with diabetes medications and taking them regularly.   Parvin Navarro had an eye exam 12/5/22.  no h/o diabetic retinopathy  Microvascular complications:  No Retinopathy, Nephropathy or Neuropathy   Macrovascular complications: no CAD, PVD, or Stroke  The patient receives has not received flu shot this year.    + HX of pancreatitis  No Hx of MTC  No HX of gastroparesis   No HX of UTI/Mycotic infection       PAST MEDICAL HISTORY   Past Medical History:   Diagnosis Date    Diabetes mellitus (HCC)     Gestational    Hypothyroidism 4/22/2021    Pancreatitis 1993       PAST

## 2024-06-03 NOTE — PATIENT INSTRUCTIONS
Will continue Metformin 1000 mg BID, Glipizide ER 10 mg daily -> scripts reordered  Start jardiance 10 mg daily   Zeyad 3 ordered   The patient was advised to check blood sugars in am , before meals, and at hs and send BS readings to our office in 2 weeks

## 2024-06-05 DIAGNOSIS — E55.9 VITAMIN D DEFICIENCY: ICD-10-CM

## 2024-06-05 DIAGNOSIS — E03.9 HYPOTHYROIDISM, UNSPECIFIED TYPE: ICD-10-CM

## 2024-06-05 DIAGNOSIS — E11.65 TYPE 2 DIABETES MELLITUS WITH HYPERGLYCEMIA, WITHOUT LONG-TERM CURRENT USE OF INSULIN (HCC): ICD-10-CM

## 2024-06-05 LAB
ANION GAP SERPL CALCULATED.3IONS-SCNC: 17 MMOL/L (ref 7–16)
BUN BLDV-MCNC: 13 MG/DL (ref 6–20)
CALCIUM SERPL-MCNC: 9.3 MG/DL (ref 8.6–10.2)
CHLORIDE BLD-SCNC: 105 MMOL/L (ref 98–107)
CHOLESTEROL, TOTAL: 125 MG/DL
CO2: 18 MMOL/L (ref 22–29)
CREAT SERPL-MCNC: 0.7 MG/DL (ref 0.5–1)
CREATININE URINE: 139.8 MG/DL (ref 29–226)
GFR, ESTIMATED: >90 ML/MIN/1.73M2
GLUCOSE BLD-MCNC: 135 MG/DL (ref 74–99)
HCT VFR BLD CALC: 36.7 % (ref 34–48)
HDLC SERPL-MCNC: 39 MG/DL
HEMOGLOBIN: 11.7 G/DL (ref 11.5–15.5)
LDL CHOLESTEROL: 76 MG/DL
MCH RBC QN AUTO: 27.2 PG (ref 26–35)
MCHC RBC AUTO-ENTMCNC: 31.9 G/DL (ref 32–34.5)
MCV RBC AUTO: 85.3 FL (ref 80–99.9)
MICROALBUMIN/CREAT 24H UR: <12 MG/L (ref 0–19)
MICROALBUMIN/CREAT UR-RTO: NORMAL MCG/MG CREAT (ref 0–30)
PDW BLD-RTO: 12.8 % (ref 11.5–15)
PLATELET # BLD: 373 K/UL (ref 130–450)
PMV BLD AUTO: 9.7 FL (ref 7–12)
POTASSIUM SERPL-SCNC: 4.8 MMOL/L (ref 3.5–5)
RBC # BLD: 4.3 M/UL (ref 3.5–5.5)
SODIUM BLD-SCNC: 140 MMOL/L (ref 132–146)
T4 FREE: 1.1 NG/DL (ref 0.9–1.7)
TRIGL SERPL-MCNC: 49 MG/DL
TSH SERPL DL<=0.05 MIU/L-ACNC: 2.98 UIU/ML (ref 0.27–4.2)
VITAMIN D 25-HYDROXY: 33.1 NG/ML (ref 30–100)
VLDLC SERPL CALC-MCNC: 10 MG/DL
WBC # BLD: 6.4 K/UL (ref 4.5–11.5)

## 2024-06-14 ENCOUNTER — TELEPHONE (OUTPATIENT)
Dept: ENDOCRINOLOGY | Age: 44
End: 2024-06-14

## 2024-06-14 NOTE — TELEPHONE ENCOUNTER
Spoke with patient about lab results, she advised she will call Monday to have her blood sugar readings downloaded for review.

## 2024-06-14 NOTE — TELEPHONE ENCOUNTER
----- Message from BOB Medeiros NP sent at 6/6/2024 10:44 AM EDT -----  Please inform pt labs have been reviewed and are at goal

## 2024-06-18 ENCOUNTER — TELEPHONE (OUTPATIENT)
Dept: ENDOCRINOLOGY | Age: 44
End: 2024-06-18

## 2024-06-18 NOTE — TELEPHONE ENCOUNTER
Please review maria r   Taking:   Metformin 500mg 2 tabs bid  Glipizide XL 10mg daily   Jardiance 25mg daily

## 2024-06-19 NOTE — TELEPHONE ENCOUNTER
I advise that patient check her BS with a fingertick when Zeyad reads < 70 to verify a true low, Does she feel them? Please have Dania call to review her CHO intake, Be much improved just concerned if the BS are truly that low.

## 2024-06-20 ENCOUNTER — OFFICE VISIT (OUTPATIENT)
Dept: FAMILY MEDICINE CLINIC | Age: 44
End: 2024-06-20

## 2024-06-20 ENCOUNTER — FOLLOWUP TELEPHONE ENCOUNTER (OUTPATIENT)
Dept: ENDOCRINOLOGY | Age: 44
End: 2024-06-20

## 2024-06-20 VITALS
BODY MASS INDEX: 30.9 KG/M2 | SYSTOLIC BLOOD PRESSURE: 126 MMHG | OXYGEN SATURATION: 96 % | DIASTOLIC BLOOD PRESSURE: 80 MMHG | TEMPERATURE: 97.5 F | HEART RATE: 74 BPM | WEIGHT: 181 LBS | RESPIRATION RATE: 15 BRPM | HEIGHT: 64 IN

## 2024-06-20 DIAGNOSIS — K86.1 IDIOPATHIC CHRONIC PANCREATITIS (HCC): ICD-10-CM

## 2024-06-20 DIAGNOSIS — M25.551 RIGHT HIP PAIN: Primary | ICD-10-CM

## 2024-06-20 DIAGNOSIS — E03.8 OTHER SPECIFIED HYPOTHYROIDISM: ICD-10-CM

## 2024-06-20 DIAGNOSIS — E11.9 TYPE 2 DIABETES MELLITUS WITHOUT COMPLICATION, WITHOUT LONG-TERM CURRENT USE OF INSULIN (HCC): ICD-10-CM

## 2024-06-20 DIAGNOSIS — E11.65 TYPE 2 DIABETES MELLITUS WITH HYPERGLYCEMIA, WITHOUT LONG-TERM CURRENT USE OF INSULIN (HCC): Primary | ICD-10-CM

## 2024-06-20 DIAGNOSIS — Z12.31 SCREENING MAMMOGRAM, ENCOUNTER FOR: ICD-10-CM

## 2024-06-20 RX ORDER — FAMOTIDINE 10 MG
10 TABLET ORAL 2 TIMES DAILY
COMMUNITY

## 2024-06-20 ASSESSMENT — PATIENT HEALTH QUESTIONNAIRE - PHQ9
SUM OF ALL RESPONSES TO PHQ QUESTIONS 1-9: 0
SUM OF ALL RESPONSES TO PHQ9 QUESTIONS 1 & 2: 0
SUM OF ALL RESPONSES TO PHQ QUESTIONS 1-9: 0
SUM OF ALL RESPONSES TO PHQ QUESTIONS 1-9: 0
1. LITTLE INTEREST OR PLEASURE IN DOING THINGS: NOT AT ALL
SUM OF ALL RESPONSES TO PHQ QUESTIONS 1-9: 0
2. FEELING DOWN, DEPRESSED OR HOPELESS: NOT AT ALL

## 2024-06-20 NOTE — TELEPHONE ENCOUNTER
Spoke with patient over the phone for complaint of hypoglycemia. Patient states maria r most often alerts for low blood sugar at night/early morning, around 2am. Occasionally has occurred after dinner in evening. Has not compared to glucometer reading, advised to do so. Patient believes likely due to faulty sensor or sleeping on sensor at night. Per recall, diet is likely not the cause of hypoglycemia. Patient agreed to schedule diabetes meal planning to improve hyperglycemia. To meet with this RADHA 6/28/24.     RADHA Millan

## 2024-06-20 NOTE — PROGRESS NOTES
[facility]    Parivn Navarro presents to the office today for   Chief Complaint   Patient presents with    Diabetes    Hip Pain     Here for recheck on chronic conditions    Right hip pain  X 1 year  Sitting or moving aggravates it  Anterior groin pain  Doesn't want to lay on it due to pain  No OTC meds tried    Type 2 Diabetes  Went to see endo a few weeks ago  Back on medication  A1C 9.6    Agreeable to mammogram  Pap due next year    No fam hx of colon cancer  She declines screening      Review of Systems     /80   Pulse 74   Temp 97.5 °F (36.4 °C) (Temporal)   Resp 15   Ht 1.626 m (5' 4\")   Wt 82.1 kg (181 lb)   SpO2 96%   BMI 31.07 kg/m²   Physical Exam  Constitutional:       Appearance: Normal appearance.   HENT:      Head: Normocephalic and atraumatic.   Eyes:      Extraocular Movements: Extraocular movements intact.      Conjunctiva/sclera: Conjunctivae normal.   Cardiovascular:      Rate and Rhythm: Normal rate.      Heart sounds: Normal heart sounds.   Pulmonary:      Effort: Pulmonary effort is normal.      Breath sounds: Normal breath sounds.   Musculoskeletal:      Right hip: Normal.   Skin:     General: Skin is warm.   Neurological:      Mental Status: She is alert and oriented to person, place, and time.   Psychiatric:         Mood and Affect: Mood normal.         Behavior: Behavior normal.            Current Outpatient Medications:     famotidine (PEPCID) 10 MG tablet, Take 1 tablet by mouth 2 times daily, Disp: , Rfl:     Cholecalciferol (VITAMIN D3) 125 MCG (5000 UT) TABS, Take by mouth, Disp: , Rfl:     Continuous Glucose Sensor (FREESTYLE LOUISA 3 SENSOR) MISC, Change every 14 days, Disp: 6 each, Rfl: 4    metFORMIN (GLUCOPHAGE) 500 MG tablet, Take 2 tablets by mouth 2 times daily (with meals), Disp: 360 tablet, Rfl: 2    glipiZIDE (GLUCOTROL XL) 10 MG extended release tablet, Take 1 tablet by mouth daily, Disp: 30 tablet, Rfl: 3    empagliflozin (JARDIANCE) 25 MG

## 2024-06-28 ENCOUNTER — HOSPITAL ENCOUNTER (OUTPATIENT)
Dept: DIABETES SERVICES | Age: 44
Setting detail: THERAPIES SERIES
Discharge: HOME | End: 2024-06-28
Attending: INTERNAL MEDICINE
Payer: COMMERCIAL

## 2024-06-28 PROCEDURE — 97802 MEDICAL NUTRITION INDIV IN: CPT

## 2024-06-28 NOTE — PROGRESS NOTES
MNT Note for Diabetes Education    Date: 6/28/2024  Patient Name: Parvin Navarro  Referring Clinician: Esther Solo MD    Reason for Visit: Diabetes meal planning   History of attending DSMES: [x] Yes  [] No  Date: Many years ago  History of MNT: [] Yes  [x] No   Date:    NUTRITION ASSESSMENT:      Sex: female    Age: 43 y.o.    Height: 64 inches    CBW: 181 lbs  BMI: 31.07        Past Medical History:   Diagnosis Date    Diabetes mellitus (HCC)     Gestational    Hypothyroidism 04/22/2021    Pancreatitis 01/01/1993       Past Surgical History:   Procedure Laterality Date    CHOLECYSTECTOMY      PANCREAS SURGERY N/A 2003    Bypass intestines through pancreas       Family History   Problem Relation Age of Onset    Breast Cancer Maternal Grandmother 40 - 49        Not sure of laterality    Cancer Paternal Grandmother         Pancreatic        Current diet  Diet Recall:  B: Usually skips, coffee. Today: Scrambled eggs + moreira  S:   L: Ivor (ham)   S: Crackers, \"junk\" food   D: Steak, burgers, chicken (meat/vegetable), potatoes, occasional restaurant   S: Occasional ice cream     Her diet contains inadequate amounts of protein, inadequate amounts of healthy fats, inadequate amounts of green, leafy vegetables, and inadequate amounts of fruits.    Eating food from outside the home:  0-1 times per week    Weight History:  []Increased in past yr   []Decreased in past yr   [x]Stable    Appetite:  normal    Digestive concerns:  None    Fluid Intake: 48-64 oz/day  States they drink mainly: Milk, iced coffee, flavored water, pop (zero sugar)    Medications:  Prior to Admission medications    Medication Sig Start Date End Date Taking? Authorizing Provider   famotidine (PEPCID) 10 MG tablet Take 1 tablet by mouth 2 times daily    Asha Stephenson MD   Cholecalciferol (VITAMIN D3) 125 MCG (5000 UT) TABS Take by mouth    Asha Stephenson MD   Continuous Glucose Sensor (FREESTYLE LOUISA 3 SENSOR) MISC Change

## 2024-08-06 ENCOUNTER — OFFICE VISIT (OUTPATIENT)
Age: 44
End: 2024-08-06

## 2024-08-06 VITALS
TEMPERATURE: 97.3 F | DIASTOLIC BLOOD PRESSURE: 68 MMHG | SYSTOLIC BLOOD PRESSURE: 118 MMHG | HEIGHT: 64 IN | HEART RATE: 64 BPM | BODY MASS INDEX: 29.53 KG/M2 | RESPIRATION RATE: 18 BRPM | WEIGHT: 173 LBS | OXYGEN SATURATION: 99 %

## 2024-08-06 DIAGNOSIS — K86.1 IDIOPATHIC CHRONIC PANCREATITIS (HCC): ICD-10-CM

## 2024-08-06 DIAGNOSIS — E11.9 TYPE 2 DIABETES MELLITUS WITHOUT COMPLICATION, WITHOUT LONG-TERM CURRENT USE OF INSULIN (HCC): ICD-10-CM

## 2024-08-06 DIAGNOSIS — Z00.00 EXAMINATION: Primary | ICD-10-CM

## 2024-08-06 DIAGNOSIS — E03.8 OTHER SPECIFIED HYPOTHYROIDISM: ICD-10-CM

## 2024-08-06 LAB
BILIRUBIN, POC: NEGATIVE
BLOOD URINE, POC: NEGATIVE
CLARITY, POC: CLEAR
COLOR, POC: YELLOW
GLUCOSE URINE, POC: 500
KETONES, POC: NEGATIVE
LEUKOCYTE EST, POC: NEGATIVE
NITRITE, POC: NEGATIVE
PH, POC: 6
PROTEIN, POC: NEGATIVE
SPECIFIC GRAVITY, POC: 1.02
UROBILINOGEN, POC: 0.2

## 2024-08-06 PROCEDURE — 99213 OFFICE O/P EST LOW 20 MIN: CPT | Performed by: NURSE PRACTITIONER

## 2024-08-06 PROCEDURE — 81002 URINALYSIS NONAUTO W/O SCOPE: CPT | Performed by: NURSE PRACTITIONER

## 2024-08-06 SDOH — ECONOMIC STABILITY: HOUSING INSECURITY
IN THE LAST 12 MONTHS, WAS THERE A TIME WHEN YOU DID NOT HAVE A STEADY PLACE TO SLEEP OR SLEPT IN A SHELTER (INCLUDING NOW)?: NO

## 2024-08-06 SDOH — ECONOMIC STABILITY: FOOD INSECURITY: WITHIN THE PAST 12 MONTHS, THE FOOD YOU BOUGHT JUST DIDN'T LAST AND YOU DIDN'T HAVE MONEY TO GET MORE.: NEVER TRUE

## 2024-08-06 SDOH — ECONOMIC STABILITY: FOOD INSECURITY: WITHIN THE PAST 12 MONTHS, YOU WORRIED THAT YOUR FOOD WOULD RUN OUT BEFORE YOU GOT MONEY TO BUY MORE.: NEVER TRUE

## 2024-08-06 SDOH — ECONOMIC STABILITY: INCOME INSECURITY: HOW HARD IS IT FOR YOU TO PAY FOR THE VERY BASICS LIKE FOOD, HOUSING, MEDICAL CARE, AND HEATING?: NOT HARD AT ALL

## 2024-08-06 ASSESSMENT — ENCOUNTER SYMPTOMS
PHOTOPHOBIA: 0
FACIAL SWELLING: 0
BLOOD IN STOOL: 0
TROUBLE SWALLOWING: 0
SORE THROAT: 0
CHEST TIGHTNESS: 0
VOICE CHANGE: 0
ABDOMINAL PAIN: 0
SINUS PAIN: 0
DIARRHEA: 0
ANAL BLEEDING: 0
ABDOMINAL DISTENTION: 0
EYE DISCHARGE: 0
EYE REDNESS: 0
SINUS PRESSURE: 0
COUGH: 0
CONSTIPATION: 0
RHINORRHEA: 0
APNEA: 0
COLOR CHANGE: 0
NAUSEA: 0
VOMITING: 0
STRIDOR: 0
RECTAL PAIN: 0
EYE ITCHING: 0
BACK PAIN: 0
WHEEZING: 0
CHOKING: 0
SHORTNESS OF BREATH: 0
EYE PAIN: 0

## 2024-08-06 ASSESSMENT — PATIENT HEALTH QUESTIONNAIRE - PHQ9
1. LITTLE INTEREST OR PLEASURE IN DOING THINGS: NOT AT ALL
SUM OF ALL RESPONSES TO PHQ QUESTIONS 1-9: 0
2. FEELING DOWN, DEPRESSED OR HOPELESS: NOT AT ALL
SUM OF ALL RESPONSES TO PHQ QUESTIONS 1-9: 0
SUM OF ALL RESPONSES TO PHQ9 QUESTIONS 1 & 2: 0
SUM OF ALL RESPONSES TO PHQ QUESTIONS 1-9: 0
SUM OF ALL RESPONSES TO PHQ QUESTIONS 1-9: 0

## 2024-08-06 NOTE — PROGRESS NOTES
24  Parvin Navarro : 1980 Sex: female  Age: 43 y.o.    Chief Complaint   Patient presents with    Employment Physical     Work physical  for  .       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  Patient has no physical complaints.      Orders Only on 2024   Component Date Value Ref Range Status    T4 Free 2024 1.1  0.9 - 1.7 ng/dL Final    TSH 2024 2.98  0.27 - 4.20 uIU/mL Final    Microalb, Ur 2024 <12  0 - 19 mg/L Final    Creatinine, Ur 2024 139.8  29.0 - 226.0 mg/dL Final    Microalb/Crt. Ratio 2024 Can not be calculated  0.0 - 30.0 mcg/mg creat Final    Vit D, 25-Hydroxy 2024 33.1  30.0 - 100.0 ng/mL Final    Cholesterol, Total 2024 125  <200 mg/dL Final    HDL 2024 39 (L)  >40 mg/dL Final    LDL Cholesterol 2024 76  <100 mg/dL Final    Triglycerides 2024 49  <150 mg/dL Final    VLDL 2024 10  mg/dL Final    No normal range established.    Sodium 2024 140  132 - 146 mmol/L Final    Potassium 2024 4.8  3.5 - 5.0 mmol/L Final    Chloride 2024 105  98 - 107 mmol/L Final    CO2 2024 18 (L)  22 - 29 mmol/L Final    Anion Gap 2024 17 (H)  7 - 16 mmol/L Final    Glucose 2024 135 (H)  74 - 99 mg/dL Final    BUN 2024 13  6 - 20 mg/dL Final    Creatinine 2024 0.7  0.50 - 1.00 mg/dL Final    Est, Glom Filt Rate 2024 >90  >60 mL/min/1.73m2 Final    Comment:   These results are not intended for use in patients <18 years of age.    eGFR results are calculated without a race factor using the  CKD-EPI equation.  Careful clinical correlation is recommended, particularly when comparing to results   calculated using previous equations.  The CKD-EPI equation is less accurate in patients with extremes of muscle mass, extra-renal   metabolism of creatine, excessive creatine ingestion, or following therapy that affects   renal tubular

## 2024-09-27 DIAGNOSIS — E11.65 TYPE 2 DIABETES MELLITUS WITH HYPERGLYCEMIA, WITHOUT LONG-TERM CURRENT USE OF INSULIN (HCC): ICD-10-CM

## 2024-09-27 RX ORDER — LEVOTHYROXINE SODIUM 50 UG/1
TABLET ORAL
Qty: 34 TABLET | Refills: 0 | Status: SHIPPED | OUTPATIENT
Start: 2024-09-27

## 2024-10-14 ENCOUNTER — OFFICE VISIT (OUTPATIENT)
Dept: ENDOCRINOLOGY | Age: 44
End: 2024-10-14
Payer: COMMERCIAL

## 2024-10-14 VITALS — BODY MASS INDEX: 30.22 KG/M2 | WEIGHT: 177 LBS | HEIGHT: 64 IN

## 2024-10-14 DIAGNOSIS — E11.65 TYPE 2 DIABETES MELLITUS WITH HYPERGLYCEMIA, WITHOUT LONG-TERM CURRENT USE OF INSULIN (HCC): Primary | ICD-10-CM

## 2024-10-14 DIAGNOSIS — Z91.119 DIETARY NONCOMPLIANCE: ICD-10-CM

## 2024-10-14 DIAGNOSIS — E03.9 HYPOTHYROIDISM, UNSPECIFIED TYPE: ICD-10-CM

## 2024-10-14 LAB — HBA1C MFR BLD: 6.9 %

## 2024-10-14 PROCEDURE — 95251 CONT GLUC MNTR ANALYSIS I&R: CPT | Performed by: NURSE PRACTITIONER

## 2024-10-14 PROCEDURE — 99214 OFFICE O/P EST MOD 30 MIN: CPT | Performed by: NURSE PRACTITIONER

## 2024-10-14 PROCEDURE — 83036 HEMOGLOBIN GLYCOSYLATED A1C: CPT | Performed by: NURSE PRACTITIONER

## 2024-10-14 PROCEDURE — 3044F HG A1C LEVEL LT 7.0%: CPT | Performed by: NURSE PRACTITIONER

## 2024-10-14 RX ORDER — BLOOD-GLUCOSE SENSOR
EACH MISCELLANEOUS
Qty: 6 EACH | Refills: 4 | Status: SHIPPED | OUTPATIENT
Start: 2024-10-14

## 2024-10-14 RX ORDER — LEVOTHYROXINE SODIUM 50 UG/1
TABLET ORAL
Qty: 34 TABLET | Refills: 0 | Status: SHIPPED | OUTPATIENT
Start: 2024-10-14

## 2024-10-14 RX ORDER — GLIPIZIDE 10 MG/1
10 TABLET, FILM COATED, EXTENDED RELEASE ORAL DAILY
Qty: 30 TABLET | Refills: 3 | Status: SHIPPED | OUTPATIENT
Start: 2024-10-14

## 2024-10-14 NOTE — PROGRESS NOTES
[employer]  [facility] Department of Endocrinology Diabetes and Metabolism   [address], [place] [address]  Phone: [phone]  Fax: [phone]    Date of Service: 10/14/2024'  Primary Care Physician: Esther Solo MD  Provider: ,BOB Medeiros NP      Reason for the visit:    DM type 2     History of Present Illness:  The history is provided by the patient. No  was used. Accuracy of the patient data is excellent.  Parvin Navarro is a very pleasant 43 y.o. female seen today for diabetes management     Last OV  2/2023    Parvin Navarro was diagnosed with diabetes at age 32 years old and was currently ordered Metformin 1000 mg BID and glipizide 10 mg daily, jardiance 25 mg daily     Avoid GLP-1 due to hx of pancreatitis     Zeyad  Tir 75%  Hyperglycemia 21%  Low 1%  AVG BS  144  GMI  6.8%    Most recent A1c results summarized below  Lab Results   Component Value Date/Time    LABA1C 6.9 10/14/2024 10:39 AM    LABA1C 7.5 02/22/2023 09:48 AM    LABA1C 9.6 12/06/2022 10:02 AM     Patient has had no hypoglycemic episodes   The patient admit that recent she has been mindful of what has been eating and  following diabetic diet    I reviewed current medications and the patient has no issues with diabetes medications and taking them regularly.   Parvin Navarro had an eye exam 12/5/22.  no h/o diabetic retinopathy  Microvascular complications:  No Retinopathy, Nephropathy or Neuropathy   Macrovascular complications: no CAD, PVD, or Stroke  The patient receives has not received flu shot this year.    + HX of pancreatitis  No Hx of MTC  No HX of gastroparesis   No HX of UTI/Mycotic infection       PAST MEDICAL HISTORY   Past Medical History:   Diagnosis Date    Diabetes mellitus (HCC)     Gestational    Hypothyroidism 04/22/2021    Pancreatitis 01/01/1993       PAST SURGICAL HISTORY   Past Surgical History:   Procedure Laterality Date

## 2024-11-04 DIAGNOSIS — B37.9 YEAST INFECTION: Primary | ICD-10-CM

## 2024-11-04 RX ORDER — FLUCONAZOLE 150 MG/1
150 TABLET ORAL ONCE
Qty: 1 TABLET | Refills: 0 | Status: SHIPPED | OUTPATIENT
Start: 2024-11-04 | End: 2024-11-04

## 2024-11-29 DIAGNOSIS — E11.65 TYPE 2 DIABETES MELLITUS WITH HYPERGLYCEMIA, WITHOUT LONG-TERM CURRENT USE OF INSULIN (HCC): ICD-10-CM

## 2024-11-29 RX ORDER — LEVOTHYROXINE SODIUM 50 UG/1
TABLET ORAL
Qty: 34 TABLET | Refills: 0 | OUTPATIENT
Start: 2024-11-29

## 2024-12-27 DIAGNOSIS — E11.65 TYPE 2 DIABETES MELLITUS WITH HYPERGLYCEMIA, WITHOUT LONG-TERM CURRENT USE OF INSULIN (HCC): ICD-10-CM

## 2024-12-27 RX ORDER — LEVOTHYROXINE SODIUM 50 UG/1
TABLET ORAL
Qty: 34 TABLET | Refills: 0 | Status: SHIPPED | OUTPATIENT
Start: 2024-12-27

## 2024-12-27 NOTE — TELEPHONE ENCOUNTER
Last fill from pharmacy 9/27/24 for 30 tabs, last office visit states she stopped the medication     Pt is stating she just didn't have time to call for a refill and that is why she is not taking it.     Do you want to check labs? Just send the refill? Please advise

## 2025-02-06 DIAGNOSIS — E11.65 TYPE 2 DIABETES MELLITUS WITH HYPERGLYCEMIA, WITHOUT LONG-TERM CURRENT USE OF INSULIN (HCC): ICD-10-CM

## 2025-02-06 RX ORDER — EMPAGLIFLOZIN 25 MG/1
25 TABLET, FILM COATED ORAL DAILY
Qty: 30 TABLET | Refills: 0 | Status: SHIPPED | OUTPATIENT
Start: 2025-02-06

## 2025-02-06 RX ORDER — GLIPIZIDE 10 MG/1
10 TABLET, FILM COATED, EXTENDED RELEASE ORAL DAILY
Qty: 30 TABLET | Refills: 0 | Status: SHIPPED | OUTPATIENT
Start: 2025-02-06

## 2025-02-17 ENCOUNTER — OFFICE VISIT (OUTPATIENT)
Dept: ENDOCRINOLOGY | Age: 45
End: 2025-02-17
Payer: COMMERCIAL

## 2025-02-17 VITALS
DIASTOLIC BLOOD PRESSURE: 86 MMHG | SYSTOLIC BLOOD PRESSURE: 125 MMHG | WEIGHT: 173 LBS | HEIGHT: 64 IN | HEART RATE: 90 BPM | BODY MASS INDEX: 29.53 KG/M2 | OXYGEN SATURATION: 100 % | RESPIRATION RATE: 16 BRPM

## 2025-02-17 DIAGNOSIS — E03.9 HYPOTHYROIDISM, UNSPECIFIED TYPE: ICD-10-CM

## 2025-02-17 DIAGNOSIS — E55.9 VITAMIN D DEFICIENCY: ICD-10-CM

## 2025-02-17 DIAGNOSIS — E11.65 TYPE 2 DIABETES MELLITUS WITH HYPERGLYCEMIA, WITHOUT LONG-TERM CURRENT USE OF INSULIN (HCC): Primary | ICD-10-CM

## 2025-02-17 LAB — HBA1C MFR BLD: 7.3 %

## 2025-02-17 PROCEDURE — 99214 OFFICE O/P EST MOD 30 MIN: CPT | Performed by: NURSE PRACTITIONER

## 2025-02-17 PROCEDURE — 3051F HG A1C>EQUAL 7.0%<8.0%: CPT | Performed by: NURSE PRACTITIONER

## 2025-02-17 PROCEDURE — 83036 HEMOGLOBIN GLYCOSYLATED A1C: CPT | Performed by: NURSE PRACTITIONER

## 2025-02-17 PROCEDURE — 95251 CONT GLUC MNTR ANALYSIS I&R: CPT | Performed by: NURSE PRACTITIONER

## 2025-02-17 RX ORDER — LEVOTHYROXINE SODIUM 50 UG/1
TABLET ORAL
Qty: 34 TABLET | Refills: 5 | Status: SHIPPED | OUTPATIENT
Start: 2025-02-17

## 2025-02-17 NOTE — PROGRESS NOTES
[facility]  [facility] Department of Endocrinology Diabetes and Metabolism   [address], [place] [id number]  Phone: [phone]  Fax: [phone]    Date of Service: 2/17/2025'  Primary Care Physician: Esther Solo MD  Provider: ,BOB Medeiros NP      Reason for the visit:    DM type 2     History of Present Illness:  The history is provided by the patient. No  was used. Accuracy of the patient data is excellent.  Parvin Navarro is a very pleasant 44 y.o. female seen today for diabetes management     Last OV  2/2023    Parvin Navarro was diagnosed with diabetes at age 32 years old and was currently ordered Metformin 1000 mg BID and glipizide 10 mg daily, jardiance 25 mg daily     Avoid GLP-1 due to hx of pancreatitis     Zeyad  Tir 70%  Hyperglycemia 27%  Low 9%  GMI  7.0%    Most recent A1c results summarized below  Lab Results   Component Value Date/Time    LABA1C 7.3 02/17/2025 09:38 AM    LABA1C 6.9 10/14/2024 10:39 AM    LABA1C 7.5 02/22/2023 09:48 AM     Patient has had no hypoglycemic episodes -> lows are not accurate or from not eating a meal  The patient admit that recent she has been mindful of what has been eating and  following diabetic diet    I reviewed current medications and the patient has no issues with diabetes medications and taking them regularly.   Parvin Navarro had an eye exam 12/5/22.  no h/o diabetic retinopathy  Microvascular complications:  No Retinopathy, Nephropathy or Neuropathy   Macrovascular complications: no CAD, PVD, or Stroke  The patient receives has not received flu shot this year.    + HX of pancreatitis  No Hx of MTC  No HX of gastroparesis   No HX of UTI/Mycotic infection       PAST MEDICAL HISTORY   Past Medical History:   Diagnosis Date    Diabetes mellitus (HCC)     Gestational    Hypothyroidism 04/22/2021    Pancreatitis 01/01/1993       PAST SURGICAL HISTORY   Past Surgical History:

## 2025-03-03 DIAGNOSIS — E11.65 TYPE 2 DIABETES MELLITUS WITH HYPERGLYCEMIA, WITHOUT LONG-TERM CURRENT USE OF INSULIN (HCC): ICD-10-CM

## 2025-03-03 RX ORDER — GLIPIZIDE 10 MG/1
10 TABLET, FILM COATED, EXTENDED RELEASE ORAL DAILY
Qty: 90 TABLET | Refills: 1 | Status: SHIPPED | OUTPATIENT
Start: 2025-03-03

## 2025-06-17 ENCOUNTER — OFFICE VISIT (OUTPATIENT)
Dept: ENDOCRINOLOGY | Age: 45
End: 2025-06-17
Payer: COMMERCIAL

## 2025-06-17 VITALS
DIASTOLIC BLOOD PRESSURE: 84 MMHG | TEMPERATURE: 97.7 F | WEIGHT: 178 LBS | HEART RATE: 77 BPM | OXYGEN SATURATION: 99 % | SYSTOLIC BLOOD PRESSURE: 116 MMHG | BODY MASS INDEX: 30.39 KG/M2 | RESPIRATION RATE: 18 BRPM | HEIGHT: 64 IN

## 2025-06-17 DIAGNOSIS — Z91.119 DIETARY NONCOMPLIANCE: ICD-10-CM

## 2025-06-17 DIAGNOSIS — E11.65 TYPE 2 DIABETES MELLITUS WITH HYPERGLYCEMIA, WITHOUT LONG-TERM CURRENT USE OF INSULIN (HCC): Primary | ICD-10-CM

## 2025-06-17 DIAGNOSIS — E55.9 VITAMIN D DEFICIENCY: ICD-10-CM

## 2025-06-17 DIAGNOSIS — E03.9 HYPOTHYROIDISM, UNSPECIFIED TYPE: ICD-10-CM

## 2025-06-17 LAB — HBA1C MFR BLD: 7.2 %

## 2025-06-17 PROCEDURE — 99214 OFFICE O/P EST MOD 30 MIN: CPT | Performed by: NURSE PRACTITIONER

## 2025-06-17 PROCEDURE — 83036 HEMOGLOBIN GLYCOSYLATED A1C: CPT | Performed by: NURSE PRACTITIONER

## 2025-06-17 PROCEDURE — 3051F HG A1C>EQUAL 7.0%<8.0%: CPT | Performed by: NURSE PRACTITIONER

## 2025-06-17 PROCEDURE — 95251 CONT GLUC MNTR ANALYSIS I&R: CPT | Performed by: NURSE PRACTITIONER

## 2025-06-17 RX ORDER — GLIPIZIDE 10 MG/1
10 TABLET, FILM COATED, EXTENDED RELEASE ORAL DAILY
Qty: 90 TABLET | Refills: 1 | Status: SHIPPED | OUTPATIENT
Start: 2025-06-17

## 2025-06-17 NOTE — PROGRESS NOTES
[employer]  [facility] Department of Endocrinology Diabetes and Metabolism   [address], [place] [id number]  Phone: [phone]  Fax: [phone]    Date of Service: 6/17/2025'  Primary Care Physician: Esther Solo MD  Provider: ,BOB Medeiros NP      Reason for the visit:    DM type 2     History of Present Illness:  The history is provided by the patient. No  was used. Accuracy of the patient data is excellent.  Parvin Navarro is a very pleasant 44 y.o. female seen today for diabetes management     Last OV  2/2023    Parvin Navarro was diagnosed with diabetes at age 32 years old and was currently ordered Metformin 1000 mg BID and glipizide 10 mg daily, jardiance 25 mg daily     Avoid GLP-1 due to hx of pancreatitis     Zeyad  Tir 71%  Hyperglycemia 28%  Low 1%  GMI  6.9%    Most recent A1c results summarized below  Lab Results   Component Value Date/Time    LABA1C 7.2 06/17/2025 10:50 AM    LABA1C 7.3 02/17/2025 09:38 AM    LABA1C 6.9 10/14/2024 10:39 AM     Patient has had no hypoglycemic episodes -> lows are not accurate or from not eating a meal  The patient admit that recent she has been not been mindful of what has been eating and  following diabetic diet    I reviewed current medications and the patient has no issues with diabetes medications and taking them regularly.   Parvin Navarro had an eye exam 12/5/22.  no h/o diabetic retinopathy  Microvascular complications:  No Retinopathy, Nephropathy or Neuropathy   Macrovascular complications: no CAD, PVD, or Stroke  The patient receives has not received flu shot this year.    + HX of pancreatitis  No Hx of MTC  No HX of gastroparesis   No HX of UTI/Mycotic infection       PAST MEDICAL HISTORY   Past Medical History:   Diagnosis Date    Diabetes mellitus (HCC)     Gestational    Hypothyroidism 04/22/2021    Pancreatitis 01/01/1993       PAST SURGICAL HISTORY   Past Surgical

## 2025-07-17 DIAGNOSIS — E11.65 TYPE 2 DIABETES MELLITUS WITH HYPERGLYCEMIA, WITHOUT LONG-TERM CURRENT USE OF INSULIN (HCC): ICD-10-CM

## 2025-07-17 RX ORDER — ACYCLOVIR 800 MG/1
TABLET ORAL
Qty: 6 EACH | Refills: 4 | Status: CANCELLED | OUTPATIENT
Start: 2025-07-17

## 2025-07-17 RX ORDER — HYDROCHLOROTHIAZIDE 12.5 MG/1
CAPSULE ORAL
Qty: 6 EACH | Refills: 3 | Status: SHIPPED | OUTPATIENT
Start: 2025-07-17

## 2025-08-19 DIAGNOSIS — E11.65 TYPE 2 DIABETES MELLITUS WITH HYPERGLYCEMIA, WITHOUT LONG-TERM CURRENT USE OF INSULIN (HCC): ICD-10-CM

## 2025-08-19 DIAGNOSIS — E03.9 HYPOTHYROIDISM, UNSPECIFIED TYPE: ICD-10-CM

## 2025-08-19 DIAGNOSIS — E55.9 VITAMIN D DEFICIENCY: ICD-10-CM

## 2025-08-19 LAB
ANION GAP SERPL CALCULATED.3IONS-SCNC: 12 MMOL/L (ref 7–16)
BUN BLDV-MCNC: 15 MG/DL (ref 6–20)
CALCIUM SERPL-MCNC: 9.1 MG/DL (ref 8.6–10)
CHLORIDE BLD-SCNC: 106 MMOL/L (ref 98–107)
CHOLESTEROL, TOTAL: 116 MG/DL
CO2: 22 MMOL/L (ref 22–29)
CREAT SERPL-MCNC: 0.8 MG/DL (ref 0.5–1)
CREATININE URINE: 132 MG/DL (ref 29–226)
GFR, ESTIMATED: >90 ML/MIN/1.73M2
GLUCOSE BLD-MCNC: 114 MG/DL (ref 74–99)
HCT VFR BLD CALC: 38.5 % (ref 34–48)
HDLC SERPL-MCNC: 42 MG/DL
HEMOGLOBIN: 11.7 G/DL (ref 11.5–15.5)
LDL CHOLESTEROL: 65 MG/DL
MCH RBC QN AUTO: 25.8 PG (ref 26–35)
MCHC RBC AUTO-ENTMCNC: 30.4 G/DL (ref 32–34.5)
MCV RBC AUTO: 85 FL (ref 80–99.9)
MICROALBUMIN/CREAT 24H UR: <12 MG/L (ref 0–20)
MICROALBUMIN/CREAT UR-RTO: <9 MCG/MG CREAT (ref 0–30)
PDW BLD-RTO: 14.1 % (ref 11.5–15)
PLATELET # BLD: 396 K/UL (ref 130–450)
PMV BLD AUTO: 9.9 FL (ref 7–12)
POTASSIUM SERPL-SCNC: 4.5 MMOL/L (ref 3.5–5.1)
RBC # BLD: 4.53 M/UL (ref 3.5–5.5)
SODIUM BLD-SCNC: 140 MMOL/L (ref 136–145)
T4 FREE: 1 NG/DL (ref 0.9–1.7)
TRIGL SERPL-MCNC: 49 MG/DL
TSH SERPL DL<=0.05 MIU/L-ACNC: 3.04 UIU/ML (ref 0.27–4.2)
VITAMIN D 25-HYDROXY: 47.7 NG/ML (ref 30–100)
VLDLC SERPL CALC-MCNC: 10 MG/DL
WBC # BLD: 6.3 K/UL (ref 4.5–11.5)